# Patient Record
Sex: FEMALE | Race: WHITE | NOT HISPANIC OR LATINO | Employment: UNEMPLOYED | URBAN - METROPOLITAN AREA
[De-identification: names, ages, dates, MRNs, and addresses within clinical notes are randomized per-mention and may not be internally consistent; named-entity substitution may affect disease eponyms.]

---

## 2023-06-27 PROBLEM — N92.6 MENSTRUAL PERIODS IRREGULAR: Status: ACTIVE | Noted: 2023-06-27

## 2023-06-27 PROBLEM — N64.4 BREAST TENDERNESS IN FEMALE: Status: ACTIVE | Noted: 2023-06-27

## 2023-06-27 PROBLEM — Z00.00 ANNUAL PHYSICAL EXAM: Status: ACTIVE | Noted: 2023-06-27

## 2023-06-27 PROBLEM — R53.83 FATIGUE: Status: ACTIVE | Noted: 2023-06-27

## 2023-07-24 ENCOUNTER — HOSPITAL ENCOUNTER (OUTPATIENT)
Dept: ULTRASOUND IMAGING | Facility: HOSPITAL | Age: 38
Discharge: HOME | End: 2023-07-24
Payer: COMMERCIAL

## 2023-07-24 ENCOUNTER — HOSPITAL ENCOUNTER (OUTPATIENT)
Dept: MAMMOGRAPHY | Facility: HOSPITAL | Age: 38
Discharge: HOME | End: 2023-07-24
Payer: COMMERCIAL

## 2023-07-24 DIAGNOSIS — N64.4 BREAST TENDERNESS IN FEMALE: ICD-10-CM

## 2023-07-24 PROCEDURE — 77066 DX MAMMO INCL CAD BI: CPT | Performed by: RADIOLOGY

## 2023-07-24 PROCEDURE — G0279 TOMOSYNTHESIS, MAMMO: HCPCS

## 2023-07-24 PROCEDURE — 77062 BREAST TOMOSYNTHESIS BI: CPT | Performed by: RADIOLOGY

## 2023-07-24 PROCEDURE — 77066 DX MAMMO INCL CAD BI: CPT

## 2023-09-28 ENCOUNTER — E-VISIT (OUTPATIENT)
Dept: FAMILY MEDICINE CLINIC | Facility: TELEHEALTH | Age: 38
End: 2023-09-28
Payer: COMMERCIAL

## 2023-09-28 PROCEDURE — BRIGHTMDVISIT: Performed by: NURSE PRACTITIONER

## 2023-09-28 NOTE — E-VISIT TREATED
Chief Complaint: Cold, flu, COVID, sinus, hay fever, or seasonal allergies   Patient introduction   Patient is 37-year-old female with cough, congestion, voice hoarseness, and fatigue that started 3 to 5 days ago.   COVID-19 testing history, vaccination status, and exposure:    Has not been tested for COVID-19 since symptom onset.    Patient was prompted to take a self-test during the interview, but does not have a COVID-19 test kit.    Has not received a COVID-19 vaccination.    No known exposure to a person with a confirmed or suspected case of COVID-19.    No travel outside local community within the last 14 days.   Risk factors for severe disease from COVID-19 infection    BMI >= 25.   General presentation   Symptoms came on suddenly.   Fever:    No fever.   Sinus and nasal symptoms:    Yellow nasal drainage.    Nasal drainage is thick.    Postnasal drip.    Sinus pain or pressure on or around the forehead.    Patient first noticed sinus pain less than 5 days ago.    Sinus pain is not worse with Valsalva.    No nasal discharge.    No itchy nose or sneezing.    No history of unhealed nasal septal ulcer/nasal wound.    No history of antibiotic treatment for sinus infection in the last year.    No history of deviated septum or nasal polyps.   Throat symptoms:    Voice is moderately hoarse. Patient does not believe hoarseness is due to voice strain.    No sore throat.   Head and body aches:    Fatigue.    No headache.    No sweats.    No chills.    No myalgia.   Cough:    Cough is worse in the morning.    Cough is mildly productive of sputum.    Describes color of sputum as green.   Wheezing and shortness of breath:    No COPD diagnosis.    No asthma diagnosis.    No wheezing.    No shortness of breath.    No previous albuterol inhaler use for URIs, bronchitis, or pneumonia.    No previous steroid inhaler use for URIs, bronchitis, or pneumonia.   Chest pain:    No chest pain.   Ear symptoms:    Current symptoms  include fullness and crackling or popping in the ear(s).   Dizziness:    Mild dizziness that does not interfere with daily activities.   Allergies:    No history of allergies.   Flu exposure:    No recent known exposure to a person with a confirmed flu diagnosis.    Has not had a flu vaccine this season.   Not taking any over-the-counter medications for current symptoms.   Review of red flags/alarm symptoms:    No changes in alertness or awareness.    No nuchal rigidity.    No decreased urination.   Pregnancy/menstrual status/breastfeeding:    Not pregnant.    Not breastfeeding.    Regarding date of last menstrual period, patient writes: 9/22/2023.   Self-exam:    Height: 165 centimeters    Weight: 96.1 kilograms    No difficulty moving their chin toward their chest.    Neck lymph nodes feel normal.   Recent antibiotic use:    Has not taken antibiotics for similar symptoms within the past month.   Current medications   Currently taking vitamin C, vitamin B complex .   Medication allergies   None.   Medication contraindication review   No history of anaphylactic reaction to beta-lactam antibiotics; aspirin triad; blood dyscrasia; bone marrow depression; catecholamine-releasing paraganglioma; coronary artery disease; coagulation disorder; congenital long QT syndrome; depression; electrolyte abnormalities; fungal infection; GI bleeding; GI obstruction; G6PD deficiency; heart arrhythmia; hypertension; mononucleosis; myasthenia; recent myocardial infarction; NSAID-induced asthma/urticaria; Parkinson's disease; pheochromocytoma; porphyria; Reye syndrome; seizure disorder; ulcerative colitis; and urinary retention.   No history of metoclopramide-associated dystonic reaction and tardive dyskinesia.   No known history of amoxicillin-clavulanate-associated cholestatic jaundice or hepatic impairment.   No known history of azithromycin-associated cholestatic jaundice or hepatic impairment.   Past medical history   Immune  conditions: No immunocompromising conditions.   No history of cancer.   Social history   Never smoked tobacco.   High-risk household contacts:    Household contact under the age of 5.   Patient did not request an excuse note.   Assessment   Viral sinusitis.   This is the likely diagnosis based on patient's interview responses, including:    Symptom profile    Duration of symptoms is shorter than 10 days   Plan   Medications:    benzonatate 200 mg capsule RX 200mg 1 cap PO tid PRN 7d for cough. Do not chew or cut these capsules. Amount is 21 cap.    ibuprofen 200 mg tablet OTC 200mg 2 tabs PO q8h PRN 10d for any fever, pain, or discomfort associated with your condition. Do not exceed 3200mg in a 24-hour period. Amount is 60 tab.    Mucus Relief  mg tablet, extended release OTC 600mg 1 tab PO q12h PRN 7d for cough and congestion. Amount is 14 tab.    fluticasone 50 mcg/actuation nasal spray,suspension OTC 50mcg 2 sprays each nostril nasal qd 30d for nasal symptoms due to allergies or sinusitis. Fluticasone needs to be used every day to be effective. It may take up to a week for the full effects of the medication to be seen. Brands to look for include Flonase. Amount is 16 g.   The patient's prescription will be sent to:   [id number] [address] [address]   Phone: [phone]     Fax: [phone]   Patient informed to purchase OTC medications.   Education:    Condition and causes    Prevention    Treatment and self-care    When to call provider   ----------   Electronically signed by COLTON Sánchez on 2023-09-28 at 04:12AM   ----------   Patient Interview Transcript:   Which of these symptoms are bothering you? Select all that apply.    Cough    Stuffed-up nose or sinuses    Hoarse voice or loss of voice    Fatigue or tiredness   Not selected:    Shortness of breath    Runny nose    Itchy or watery eyes    Itchy nose or sneezing    Loss of smell or taste    Sore  "throat    Headache    Fever    Sweats    Chills    Muscle or body aches    Nausea or vomiting    Diarrhea    I don't have any of these symptoms   When did your current symptoms start? Select one.    3 to 5 days ago   Not selected:    Less than 48 hours ago    6 to 9 days ago    10 to 14 days ago    2 to 3 weeks ago    3 to 4 weeks ago    More than a month ago   Do you know the exact date your symptoms started? If so, enter the date as MM/DD/YY. Select one.    No   Not selected:    Yes (specify)   Did your symptoms come on suddenly or gradually? Select one.    Suddenly   Not selected:    Gradually    I'm not sure   Since your current symptoms started, have you had a viral test for COVID-19? - This includes home self-tests as well as nose swab or saliva tests done at a doctor's office, lab, or testing site. - It does NOT include antibody tests, which use a blood sample to test for past infection. Select one.    No   Not selected:    Yes   Taking a home COVID test can help your provider give you the best care. If you have a COVID test kit, take the test now before continuing with this interview. Do you have a COVID test kit? Select one.    No, I don't have a test kit   Not selected:    Yes, and I'll take a test now    Yes, but I prefer not to take a test now   Have you gotten the COVID-19 vaccine? Select one.    No   Not selected:    Yes   In the last 14 days, have you traveled outside of your local community? This includes travel by car, RV, bus, train, or plane. Travel increases your chances of getting and spreading COVID-19. Select one.    No   Not selected:    Yes   In the last 14 days, have you had close contact with someone who has coronavirus (COVID-19)? \"Close contact\" means any of these: - Living in the same household as someone with COVID-19. - Caring for someone with COVID-19. - Being within 6 feet of someone with COVID-19 for a total of at least 15 minutes over a 24-hour period. For example, three 5-minute " "exposures for a total of 15 minutes. - Being in direct contact with respiratory droplets from someone with COVID-19 (being coughed on, kissing, sharing utensils). Select one.    No, not that I know of   Not selected:    Yes, a confirmed case    Yes, a suspected case   Do you cough so hard that it's made you gag or vomit? By gag, we mean has your coughing made you choke or dry heave? Select all that apply.    No   Not selected:    Yes, my coughing has made me gag    Yes, my coughing has made me vomit   When is your cough the worst? Select all that apply.    In the morning, or when I wake up   Not selected:    During the day    At nighttime, or while I'm sleeping    I haven't noticed a difference depending on time of day   Are you coughing up mucus or phlegm? Select one.    Yes, a little   Not selected:    No, my cough is dry    Yes, a lot   What color is most of the mucus or phlegm that you're coughing up? Select one.    Green or greenish   Not selected:    Clear    White/frothy    Yellow or yellowish    Red or pink    I'm not sure   Do you feel sinus pain or pressure in any of these areas?    In my forehead   Not selected:    Around my eyes    Behind my nose    In my cheeks    In my upper teeth or jaw    No   When did you first notice your sinus pain or pressure? Select one.    Less than 5 days ago   Not selected:    5 to 9 days ago    10 to 14 days ago    2 to 4 weeks ago    1 month ago or longer   Does coughing, sneezing, or leaning forward make your sinuses feel worse? Select one.    No   Not selected:    Yes   What color is your nasal drainage? Select one.    Yellow or yellowish   Not selected:    Clear    White    Green or greenish    My nose is stuffed but not draining or running   Is your nasal drainage thick or thin? Select one.    Thick   Not selected:    Thin   Is there any drainage (mucus) going down the back of your throat? This kind of drainage is also called \"postnasal drip.\" Select one.    Yes   Not " selected:    No, not that I know of   Since your symptoms started, have you felt dizzy? Select one.    Yes, but I can continue with my regular daily activities   Not selected:    Yes, and it makes it hard to stand, walk, or do daily activities    No   Do you have chest pain? You might also feel it as discomfort, aching, tightness, or squeezing in the chest. Select one.    No   Not selected:    Yes   Have you urinated at least 3 times in the last 24 hours? Select one.    Yes   Not selected:    No   Changes in alertness or awareness may mean you need emergency care. Since your symptoms started, have you had any of these? Select all that apply.    None of the above   Not selected:    Confusion    Slurred speech    Not knowing where you are or what day it is    Difficulty staying conscious    Fainting or passing out   Do your symptoms include a whistling sound, or wheezing, when you breathe? Select one.    No   Not selected:    Yes    I'm not sure   Early in this interview, you told us you were hoarse or you'd lost your voice. How would you describe the changes to your voice? Select one.    It's harder than usual to talk   Not selected:    It just sounds a little raspy    I can barely talk at all   Is it possible that you strained your voice? Singing, yelling, or talking more or louder than usual can cause voice strain. Select one.    No, not that I know of   Not selected:    Yes   Do you have any of these symptoms in your ear(s)? Select all that apply.    Fullness    Crackling or popping   Not selected:    Pain    Pressure    Plugged or blocked sensation    None of the above   Can you move your chin toward your chest?    Yes   Not selected:    No, my neck is too stiff   Are your glands/lymph nodes swollen, or does it hurt when you touch them?    No, not that I can tell   Not selected:    Yes   In the past week, has anyone around you (such as at school, work, or home) had a confirmed diagnosis of the flu? A confirmed  diagnosis means that a nose swab was done to verify a flu infection. Select all that apply.    No, not that I know of   Not selected:    I live with someone who has the flu    I've been within touching distance of someone who has the flu    I've walked by, or sat about 3 feet away from, someone who has the flu    I've been in the same building as someone who has the flu   Have you ever been diagnosed with asthma? Select one.    No   Not selected:    Yes   Have you ever been prescribed albuterol to use for wheezing, cough, or shortness of breath caused by a cold, bronchitis, or pneumonia? Albuterol (ProAir, Proventil, Ventolin) is prescribed as an inhaler or a solution to be used with a nebulizer machine. Select one.    No, not that I know of   Not selected:    Yes   Have you ever been prescribed a steroid inhaler to use for wheezing, cough, or shortness of breath caused by a cold, bronchitis, or pneumonia? Some examples of steroid inhalers include Pulmicort, Flovent, Qvar, and Alvesco. Select one.    No, not that I know of   Not selected:    Yes   Have you ever been diagnosed with chronic obstructive pulmonary disease (COPD)? Select one.    No, not that I know of   Not selected:    Yes   In the past month, have you taken antibiotics for similar symptoms? Examples of antibiotics include amoxicillin, amoxicillin-clavulanate (Augmentin), penicillin, cefdinir (Omnicef), doxycycline, and clindamycin (Cleocin). Select one.    No   Not selected:    Yes (specify)   In the last year, how many times were you treated with antibiotics for a sinus infection? Select one.    None   Not selected:    1 to 3 times    4 or more times   Have you been diagnosed with a deviated septum or nasal polyps? The nose is divided into two nostrils by the septum. A crooked septum is called a deviated septum. Nasal polyps are growths inside the nose or sinuses. Select one.    No, not that I know of   Not selected:    Yes, but I had surgery to treat  them    Yes, I have a deviated septum    Yes, I have nasal polyps    Yes, I have a deviated septum and nasal polyps   Do you have a sore inside your nose that won't heal? Select one.    No, not that I know of   Not selected:    Yes   Do you have allergies (pollen, dust mites, mold, animal dander)? Select one.    No, not that I know of   Not selected:    Yes   Have you had a flu shot this season? Select one.    No   Not selected:    Yes, less than 2 weeks ago    Yes, 2 to 4 weeks ago    Yes, 1 to 3 months ago    Yes, 3 to 6 months ago    Yes, more than 6 months ago   Are you pregnant? Select one.    No   Not selected:    Yes   When was your last menstrual period? If you don't currently have periods or no longer have periods, please briefly explain.    2023   Within the last 2 weeks, have you: - Given birth - Had a miscarriage - Had a pregnancy loss - Had an  Being postpartum (live birth or loss) within the last 2 weeks increases your risk of flu complications. Select one.    No   Not selected:    Yes   Are you breastfeeding? Select one.    No   Not selected:    Yes   The flu and COVID-19 can be more serious for people in certain groups. The next few questions help us figure out if you or anyone you live with is at higher risk for complications from these infections. Do any of these statements apply to you? Select all that apply.    None of the above   Not selected:    I'm     I'm     I'm Black    I'm  or    Do you smoke tobacco? Select one.    No   Not selected:    Yes, every day    Yes, some days    No, I quit   Do you have any of these conditions? Select all that apply.    None of the above   Not selected:    Chronic lung disease, such as cystic fibrosis or interstitial fibrosis    Heart disease, such as congenital heart disease, congestive heart failure, or coronary artery disease    Disorder of the brain, spinal cord, or nerves and muscles, such as dementia,  cerebral palsy, epilepsy, muscular dystrophy, or developmental delay    Metabolic disorder or mitochondrial disease    Cerebrovascular disease, such as stroke or another condition affecting the blood vessels or blood supply to the brain    Down syndrome    Mood disorder, including depression or schizophrenia spectrum disorders    Substance use disorder, such as alcohol, opioid, or cocaine use disorder    Tuberculosis   Do you live in a group care setting? Examples include: - Nursing home - Residential care - Psychiatric treatment facility - Group home - [facility] - Board and care home - Homeless shelter - Foster care setting Select one.    No   Not selected:    Yes   Are you a healthcare worker? Select one.    No   Not selected:    Yes   People with a very high body mass index (BMI) are at higher risk for developing complications from the flu and severe illness from COVID-19. To determine your BMI, we need to know your weight and height. Please enter your weight (in pounds).    Weight   Please enter your height.    Height   Do you have any of these conditions that can affect the immune system? Scroll to see all options. Select all that apply.    None of these   Not selected:    History of bone marrow transplant    Chronic kidney disease    Chronic liver disease (including cirrhosis)    HIV/AIDS    Inflammatory bowel disease (Crohn's disease or ulcerative colitis)    Lupus    Moderate to severe plaque psoriasis    Multiple sclerosis    Rheumatoid arthritis    Sickle cell anemia    Alpha or beta thalassemia    History of solid organ transplant (kidney, liver, or heart)    History of spleen removal    An autoimmune disorder not listed here (specify)    A condition requiring treatment with long-term use of oral steroids (such as prednisone, prednisolone, or dexamethasone) (specify)   Have you ever been diagnosed with cancer? Select one.    No   Not selected:    Yes, I have cancer now    Yes, but I'm in remission   Do  any of these apply to you? Select all that apply.    None of the above   Not selected:    I've been hospitalized within the last 5 days    I have diabetes    I'm in close contact with a child in    The flu and COVID-19 can be more serious for people in certain groups. Do any of these apply to the people who live with you? Select all that apply.    Under age 5   Not selected:    Over age 65            Black     or     Pregnant    Has given birth, had a miscarriage, had a pregnancy loss, or had an  in the last 2 weeks    None of the above   Does any member of your household have any of these medical conditions? Select all that apply.    None of the above   Not selected:    Asthma    Disorders of the brain, spinal cord, or nerves and muscles, such as dementia, cerebral palsy, epilepsy, muscular dystrophy, or developmental delay    Chronic lung disease, such as COPD or cystic fibrosis    Heart disease, such as congenital heart disease, congestive heart failure, or coronary artery disease    Cerebrovascular disease, such as stroke or another condition affecting the blood vessels or blood supply to the brain    Blood disorders, such as sickle cell disease    Diabetes    Metabolic disorders such as inherited metabolic disorders or mitochondrial disease    Kidney disorders    Liver disorders    Weakened immune system due to illness or medications such as chemotherapy or steroids    Children under the age of 19 who are on long-term aspirin therapy    Extreme obesity (BMI > 40)   Do you have any of these conditions? Scroll to see all options. Select all that apply.    None of the above   Not selected:    Aspirin triad (also known as Samter's triad or ASA triad)    Asthma or hives from taking aspirin or other NSAIDs, such as ibuprofen or naproxen    Blockage or narrowing of the blood vessels of the heart    Blood clotting disorder    Blood dyscrasia, such anemia,  leukemia, lymphoma, or myeloma    Bone marrow depression    Catecholamine-releasing paraganglioma    Congenital long QT syndrome    Depression    Difficulty urinating or completely emptying your bladder    Uncorrected electrolyte abnormalities    Fungal infection    Gastrointestinal (GI) bleeding    Gastrointestinal (GI) obstruction    G6PD deficiency    Recent heart attack    High blood pressure    Irregular heartbeat or heart rhythm    Mononucleosis (mono)    Myasthenia gravis    Parkinson's disease    Pheochromocytoma    Reye syndrome    Seizure disorder    Thyroid disease    Ulcerative colitis   Have you ever had either of these conditions? Select all that apply.    No   Not selected:    Metoclopramide-associated dystonic reaction    Tardive dyskinesia   Just a few more questions about medications, and then you're finished. Have you used any non-prescription medications or nasal sprays for your current symptoms? Examples include saline sprays, decongestants, NyQuil, and Tylenol. Select one.    No   Not selected:    Yes   Have you taken any monoamine oxidase inhibitor (MAOI) medications in the last 14 days? Examples include rasagiline (Azilect), selegiline (Eldepryl, Zelapar), isocarboxazid (Marplan), phenelzine (Nardil), and tranylcypromine (Parnate). Select one.    No, not that I know of   Not selected:    Yes   Do you take Kynmobi or Apokyn (apomorphine)? Select one.    No   Not selected:    Yes   Are you taking any other medications, vitamins, or supplements? Select one.    Yes   Not selected:    No   Have you ever had an allergic or bad reaction to any medication? Select one.    No   Not selected:    Yes   Are you allergic to milk or to the proteins found in milk (for example, whey or casein)? A milk allergy is different from lactose intolerance. Select one.    No, not that I know of   Not selected:    Yes   Have you ever had jaundice or liver problems as a result of taking amoxicillin-clavulanate  (Augmentin)? Jaundice is a condition in which the skin and the whites of the eyes turn yellow. Select all that apply.    No, not that I know of   Not selected:    Yes, jaundice    Yes, liver problems   Have you ever had jaundice or liver problems as a result of taking azithromycin (Zithromax, Zmax)? Jaundice is a condition in which the skin and the whites of the eyes turn yellow. Select all that apply.    No, not that I know of   Not selected:    Yes, jaundice    Yes, liver problems   Do you need a doctor's note? A doctor's note confirms that you received care today and states when you can return to school or work. It does not contain information about your diagnosis or treatment plan. Your provider will make the final decision on whether to give you a doctor's note and for how long. Doctor's notes CANNOT be backdated. We can't provide medical leave paperwork through this type of visit. If more paperwork is needed to request time off, contact your primary care provider. Select one.    No   Not selected:    Today only (1 day)    Today and tomorrow (2 days)    3 days    5 days    7 days    10 days    14 days   Is there anything you'd like to add about your symptoms? Please limit your comments to the symptoms asked about in this interview. If you include comments about other concerns, your provider may recommend that you be seen in person.   The patient did not enter any additional information.   ----------   Medical history   Medical history data does not currently exist for this patient.

## 2023-09-28 NOTE — EXTERNAL PATIENT INSTRUCTIONS
Note   rest fluids PCP if symptoms continue ER for worsening symptoms such as high fever, chest pain or SOA   Diagnosis   Viral sinusitis   My name is COLTON Sánchez, and I'm a healthcare provider at [facility]. From your interview, I see that you have viral sinusitis (sinus infection).   To prevent the spread of illness to others, I recommend that you stay home and away from other people as much as possible while you're sick. When you need to be around other people, consider wearing a face mask.   Here are the main things to know about your current symptoms:    A viral sinusitis infection will get better on its own.    You can use the medications I recommend here to help ease your symptoms.   Based on what you told me in your interview, I haven't prescribed any antibiotics. Antibiotics fight bacteria, not viruses. The latest research shows that 90% of sinus infections are caused by viruses. Antibiotics won't help with your viral infection. They could even make you feel worse as they can cause or worsen nausea, diarrhea, and stomach pain. The following factors suggest that a virus is causing your symptoms:    You've had symptoms for less than 10 days. A bacterial infection is suspected when you've had symptoms longer than 10 days without improvement.    You haven't had a fever. Bacterial infections can cause a high fever.   Medications   Your pharmacy   [id number] [address] [id number] [phone]     Prescription   Benzonatate (200mg): Take 1 capsule by mouth 3 times a day as needed for cough. Do not chew or cut these capsules.   Non-prescription   Ibuprofen (200mg): Take 2 tablets by mouth every 8 hours as needed for up to 10 days for any fever, pain, or discomfort associated with your condition. Do not exceed 3200mg in a 24-hour period.   Mucus Relief ER oral tablet, extended release (600mg): Take 1 tablet by mouth every 12 hours as needed for cough and congestion.    Fluticasone (50mcg): Spray 2 times in each nostril daily for nasal symptoms due to allergies or sinusitis. Fluticasone needs to be used every day to be effective. It may take up to a week for the full effects of the medication to be seen. Brands to look for include Flonase.   About your diagnosis   Viral sinusitis is an infection of the sinuses, the hollow spaces connected to your nasal passages. These passages swell and block the drainage of fluid or mucus from the nose, causing pain, pressure, and congestion. These are the key things to know about a viral sinus infection:    It usually starts with cold symptoms.    Some medications can lessen your symptoms.    You can spread it to other people.    The 200 different viruses that cause the common cold can also cause a viral sinus infection.   Common symptoms include fever, sneezing, runny nose, congestion, sore throat, and cough. You may also notice fatigue, body aches, difficulty sleeping, or decreased appetite.   What to expect   You should feel better within 1 to 2 weeks.   When to seek care   Call us at [phone]   with any sudden or unexpected symptoms.    Fever that measures over 103F or continues for more than 3 days.    A worsening headache.    Swallowing becomes extremely difficult or impossible.    Your hoarse voice or loss of voice lasts longer than 2 weeks.    Your sinus pain continues for 10 days or more, without improvement.    More than 5 episodes of diarrhea in a day.    More than 5 episodes of vomiting in a day.    Coughing up red or bloody mucus.    Severe shortness of breath.    Severe stomach pain.    Symptoms that get better for a few days, and then suddenly get worse.    Severe chest pain   Other treatment    Rest! Your body needs rest to recover and fight infection.    Drink plenty of water to stay hydrated.    Use a clean humidifier or a cool-mist vaporizer in your room at night. Breathing humid air may help you feel better.    Place a  warm, moist washcloth over your nose and forehead to help with your sinus pain and pressure.    Try non-prescription saline nasal sprays to help your nasal symptoms.    Try using a Neti Pot to flush out your stuffy nose and sinuses. Neti Pots are available at any drugstore without a prescription.    Gargle with salt water several times a day to help your throat feel better. Cough drops and throat lozenges may provide extra relief. A teaspoon of honey stirred into warm water or weak tea can help soothe a sore throat and cough.    Avoid smoke and air pollution. Smoke can make infections worse.   Prevention    Avoid close contact with other people when you're sick.    Cover your mouth and nose when you cough or sneeze. Use a tissue or cough into your elbow. Make sure that used tissues go directly into the trash.    Avoid touching your eyes, nose, or mouth while you're sick.    Wash your hands often, especially after coughing, sneezing, or blowing your nose. If soap and water are not available, use an alcohol-based hand .    If you or someone in your home or workplace is sick, disinfect commonly used items. This includes door handles, tables, computers, remotes, and pens.    Coronavirus (COVID-19) information   Common symptoms of COVID-19 include fever, cough, shortness of breath, fatigue, muscle or body aches, headaches, new loss of sense of taste or smell, sore throat, stuffy or runny nose, nausea or vomiting, and diarrhea. Most people who get COVID-19 have mild symptoms and can rest at home until they get better. Elderly people and those with chronic medical problems may be at risk for more serious complications.   FAQs about the COVID-19 vaccine   There are four authorized COVID-19 vaccines: Jose & Jose's Carol Vaccine (J&J/Carol), Moderna, Novavax, and Pfizer-BioNTech (Pfizer). The J&J/Carol and Novavax vaccines are approved for use in people aged 18 and older. The Moderna and Pfizer vaccines  are approved for those aged 6 months and older. All four are available at no cost. Even if you don't have health insurance, you can still get the COVID-19 vaccine for free.   Which vaccine is the best? Which vaccine should I get?   All four vaccines are highly effective. Even if you get COVID-19 after being vaccinated, all of the vaccines help prevent severe disease, hospitalization, and complications.   Most people should get whichever vaccine is first available to them. However, women younger than 50 years old should consider the rare risk of blood clots with low platelets after vaccination with the J&J/AudioCompass vaccine. This risk hasn't been seen with the other three vaccines.   Are the vaccines safe?   Yes. Hundreds of millions of people in the [place] have already safely received COVID-19 vaccines under the most intense safety monitoring in the history of the [place].   Do I need the vaccine if I've already had COVID?   Yes. Vaccination helps protect you even if you've already had COVID.   If you had COVID-19 and had symptoms, wait to get vaccinated until you've recovered and completed your isolation period.   If you tested positive for COVID-19 but did not have symptoms, you can get vaccinated after 5 full days have passed since you had a positive test, as long as you don't develop symptoms.   How many doses of the vaccine do I need?   Visit www.cdc.gov/coronavirus/2019-ncov/vaccines/stay-up-to-date.html   to find out how to stay up to date with your COVID-19 vaccines.   I'm immunocompromised. How many doses of the vaccine do I need?   For information on how immunocompromised people can stay up to date with their COVID-19 vaccines, visit www.cdc.gov/coronavirus/2019-ncov/vaccines/recommendations/immuno.html  .   What are the common side effects of the vaccine?   A sore arm, tiredness, headache, and muscle pain may occur within two days of getting the vaccine and last a day or two. For the Moderna or Pfizer vaccines, side  effects are more common after the second dose. People over the age of 55 are less likely to have side effects than younger people.   After I'm up to date on vaccines, can I still get or spread COVID?   Yes, you can still get COVID, but your disease should be milder. And your risk of serious illness, hospitalization, and complications will be much lower, especially if you're up to date. Unfortunately, you can still spread COVID if you've been vaccinated. That's why it's important to follow isolation guidelines if you get sick or test positive.   After I'm up to date on vaccines, can I go back to normal?   You should still wear a mask indoors in public if:    It's required by laws, rules, regulations, or local guidance.    You have a weakened immune system.    Your age puts you at increased risk of severe disease.    You have a medical condition that puts you at increased risk of severe disease.    Someone in your household has a weakened immune system, is at increased risk for severe disease, or is unvaccinated.    You're in an area of high transmission.   Where can I get a COVID-19 vaccine?   Visit [facility]'s website for more information. To find a COVID-19 vaccination site near you, visit www.vaccines.gov/  , call [phone]  , or text your zip code to 298414 (iPAYst). Message and data rates may apply.   What about travel?   Travel increases your risk of exposure to COVID-19. For more information, see www.cdc.gov/coronavirus/2019-ncov/travelers/index.html  .   I've had close contact with someone who has COVID. Do I need to quarantine, and if so, for how long?   For the most current answer, including a calculator to determine whether you need to stay home and for how long, visit www.cdc.gov/coronavirus/2019-ncov/your-health/quarantine-isolation.html  .   I've tested positive for COVID. How long do I need to isolate?   For the latest recommendations, including a calculator to determine how long you need to  stay home, visit www.cdc.gov/coronavirus/2019-ncov/your-health/quarantine-isolation.html  .   What if I develop symptoms that might be from COVID?   For the latest recommendations on what to do if you're sick, including when to seek emergency care, visit www.cdc.gov/coronavirus/2019-ncov/if-you-are-sick/steps-when-sick.html  .    Flu vaccine information   Who should get a flu vaccine?   Everyone 6 months of age and older should get a yearly flu vaccine.   When should I get vaccinated?   You should get a flu vaccine by the end of October. Once you're vaccinated, it takes about two weeks for antibodies to develop and protect you against the flu. That's why it's important to get vaccinated as soon as possible.   After October, is it too late to get vaccinated?   No. You should still get vaccinated. As long as the flu viruses are still in your community, flu vaccines will remain available, even into January of next year or later.   Why do I need a flu vaccine EVERY year?   Flu viruses are constantly changing, so flu vaccines are usually updated from one season to the next. Your protection from the flu vaccine also lessens over time.   Is the flu vaccine safe?   Yes. Over the last 50 years, hundreds of millions of Americans have safely received the flu vaccines.   What are the side effects of flu vaccines?   You CANNOT get the flu from a flu vaccine. Common side effects of the flu shot include soreness, redness and/or swelling where the shot was given, low grade fever, and aches. Common side effects of the nasal spray flu vaccine for adults include runny nose, headaches, sore throat, and cough. For children, side effects include wheezing, vomiting, muscle aches, and fever.   Does the flu vaccine increase your risk of getting COVID-19?   No. There is no evidence that getting a flu vaccine increases your risk of getting COVID-19.   Is it safe to get the flu vaccine along with a COVID-19 vaccine?   Yes. It's safe to get the  flu vaccine with a COVID-19 vaccine or booster.   Contact your healthcare provider TODAY for details on when and where to get your flu vaccine.   Your provider   Your diagnosis was provided by COLTON Sánchez, a member of your trusted care team at [facility].   If you have any questions, call us at [phone]  .

## 2023-10-18 ENCOUNTER — OFFICE VISIT (OUTPATIENT)
Dept: FAMILY MEDICINE CLINIC | Facility: CLINIC | Age: 38
End: 2023-10-18
Payer: COMMERCIAL

## 2023-10-18 VITALS
WEIGHT: 210.6 LBS | OXYGEN SATURATION: 100 % | DIASTOLIC BLOOD PRESSURE: 78 MMHG | TEMPERATURE: 98.6 F | HEIGHT: 65 IN | SYSTOLIC BLOOD PRESSURE: 130 MMHG | HEART RATE: 80 BPM | BODY MASS INDEX: 35.09 KG/M2 | RESPIRATION RATE: 24 BRPM

## 2023-10-18 DIAGNOSIS — R05.9 COUGH, UNSPECIFIED TYPE: Primary | ICD-10-CM

## 2023-10-18 PROCEDURE — 99214 OFFICE O/P EST MOD 30 MIN: CPT | Performed by: STUDENT IN AN ORGANIZED HEALTH CARE EDUCATION/TRAINING PROGRAM

## 2023-10-18 RX ORDER — ALBUTEROL SULFATE 90 UG/1
2 AEROSOL, METERED RESPIRATORY (INHALATION) EVERY 4 HOURS PRN
Qty: 1 G | Refills: 0 | Status: SHIPPED | OUTPATIENT
Start: 2023-10-18

## 2023-10-18 RX ORDER — METHYLPREDNISOLONE 4 MG/1
TABLET ORAL
Qty: 21 TABLET | Refills: 0 | Status: SHIPPED | OUTPATIENT
Start: 2023-10-18

## 2023-10-18 RX ORDER — AZITHROMYCIN 250 MG/1
TABLET, FILM COATED ORAL
Qty: 6 TABLET | Refills: 0 | Status: SHIPPED | OUTPATIENT
Start: 2023-10-18

## 2023-10-18 NOTE — PROGRESS NOTES
"Chief Complaint  Cough (Pt has had cough for 1 month and is now having rib pain making it harder to breath and cough. )    History of Present Illness    Cough  Main symptom: cough without sputum  She had some sinus pressure that resolved  Began: several weeks  She has hoarse voice that is slowly improving.   She developed left rib pain from coughing Friday or Saturday  No injury that she is aware of recently but fell on ribs years ago. It hurts to press on the rib.  Sick contacts: her daughter has had a virus.   Fever: not present.   Sore throat not present   No other symptoms present.     She is not pregnant or breastfeeding    The following portions of the patient's history were reviewed and updated as appropriate: allergies, current medications, past family history, past medical history, past social history, past surgical history, and problem list.    OBJECTIVE:  /78   Pulse 80   Temp 98.6 °F (37 °C) (Temporal)   Resp 24   Ht 165.1 cm (65\")   Wt 95.5 kg (210 lb 9.6 oz)   SpO2 100%   BMI 35.05 kg/m²       Physical Exam  Constitutional:       General: She is not in acute distress.     Appearance: Normal appearance.   HENT:      Head: Normocephalic and atraumatic.      Right Ear: Tympanic membrane normal.      Left Ear: Tympanic membrane normal.      Mouth/Throat:      Pharynx: No oropharyngeal exudate or posterior oropharyngeal erythema.   Eyes:      Extraocular Movements: Extraocular movements intact.   Cardiovascular:      Rate and Rhythm: Normal rate and regular rhythm.      Heart sounds: No murmur heard.  Pulmonary:      Effort: Pulmonary effort is normal. No respiratory distress.      Breath sounds: Normal breath sounds. No stridor. No wheezing, rhonchi or rales.   Musculoskeletal:      Comments: Pain present on palpation rib 8 on left     Skin:     Findings: No rash.   Neurological:      General: No focal deficit present.      Mental Status: She is alert.   Psychiatric:         Mood and Affect: " Mood normal.                    Assessment and Plan   Diagnoses and all orders for this visit:    1. Cough, unspecified type (Primary)  -     POCT Flu A&B, Molecular  -     POCT SARS-CoV-2 Antigen  -     Cancel: POCT Strep A, molecular  -     XR Chest PA & Lateral; Future    Other orders  -     albuterol sulfate  (90 Base) MCG/ACT inhaler; Inhale 2 puffs Every 4 (Four) Hours As Needed (cough).  Dispense: 1 g; Refill: 0  -     methylPREDNISolone (MEDROL) 4 MG dose pack; Take as directed on package instructions.  Dispense: 21 tablet; Refill: 0  -     azithromycin (ZITHROMAX) 250 MG tablet; Take 2 tablets the first day, then 1 tablet daily for 4 days.  Dispense: 6 tablet; Refill: 0      Laryngitis  Will give medrol pack. Counseled on elevated glucose, bp, anxiety insomnia.     Bronchitis  Will give z sultana and albuterol.       Rib pain  Present on palpation. Will xray chest.       Advised her to return in one week for check up. She can follow up sooner if needed.     Return in about 1 week (around 10/25/2023) for with pcp.       Brit Rankin D.O.  [facility] [place]

## 2023-12-21 PROCEDURE — 87086 URINE CULTURE/COLONY COUNT: CPT | Performed by: PHYSICIAN ASSISTANT

## 2023-12-21 PROCEDURE — 87186 SC STD MICRODIL/AGAR DIL: CPT | Performed by: PHYSICIAN ASSISTANT

## 2023-12-21 PROCEDURE — 87088 URINE BACTERIA CULTURE: CPT | Performed by: PHYSICIAN ASSISTANT

## 2024-04-14 PROBLEM — R53.83 FATIGUE: Status: RESOLVED | Noted: 2023-06-27 | Resolved: 2024-04-14

## 2024-04-14 PROBLEM — N64.4 BREAST TENDERNESS IN FEMALE: Status: RESOLVED | Noted: 2023-06-27 | Resolved: 2024-04-14

## 2024-04-14 PROBLEM — O34.219 PREVIOUS CESAREAN DELIVERY AFFECTING PREGNANCY: Status: ACTIVE | Noted: 2024-04-14

## 2024-04-14 PROBLEM — N92.6 MENSTRUAL PERIODS IRREGULAR: Status: RESOLVED | Noted: 2023-06-27 | Resolved: 2024-04-14

## 2024-04-14 PROBLEM — Z01.419 WELL WOMAN EXAM: Status: ACTIVE | Noted: 2024-04-14

## 2024-04-14 PROBLEM — O09.529 AMA (ADVANCED MATERNAL AGE) MULTIGRAVIDA 35+: Status: ACTIVE | Noted: 2024-04-14

## 2024-04-18 ENCOUNTER — INITIAL PRENATAL (OUTPATIENT)
Dept: OBSTETRICS AND GYNECOLOGY | Facility: CLINIC | Age: 39
End: 2024-04-18

## 2024-04-18 VITALS — SYSTOLIC BLOOD PRESSURE: 118 MMHG | DIASTOLIC BLOOD PRESSURE: 74 MMHG | WEIGHT: 201 LBS | BODY MASS INDEX: 33.45 KG/M2

## 2024-04-18 DIAGNOSIS — O34.219 PREVIOUS CESAREAN DELIVERY AFFECTING PREGNANCY: ICD-10-CM

## 2024-04-18 DIAGNOSIS — O09.522 MULTIGRAVIDA OF ADVANCED MATERNAL AGE IN SECOND TRIMESTER: Primary | ICD-10-CM

## 2024-04-18 PROBLEM — O09.299 HX OF PREECLAMPSIA, PRIOR PREGNANCY, CURRENTLY PREGNANT: Status: ACTIVE | Noted: 2024-04-18

## 2024-04-18 LAB
2ND TRIMESTER 4 SCREEN SERPL-IMP: NORMAL
AFP INTERP SERPL-IMP: NORMAL
EXTERNAL NIPT: NORMAL

## 2024-04-18 RX ORDER — PRENATAL VIT/IRON FUM/FOLIC AC 27MG-0.8MG
TABLET ORAL DAILY
COMMUNITY

## 2024-04-18 RX ORDER — ASPIRIN 81 MG/1
81 TABLET, CHEWABLE ORAL DAILY
Start: 2024-04-18

## 2024-04-18 NOTE — PROGRESS NOTES
Subjective   Chief Complaint   Patient presents with    Initial Prenatal Visit       Tanja Calloway is a 38 y.o. year old .  Patient's last menstrual period was 2024.  She presents to be seen to initiate prenatal care.     Social History    Tobacco Use      Smoking status: Never      Smokeless tobacco: Never      Tobacco comments: None      The following portions of the patient's history were reviewed and updated as appropriate:vital signs, allergies, current medications, past medical history, past social history, past surgical history, and problem list.    Objective   Lab Review   No data reviewed    Imaging   Pelvic ultrasound report    Assessment & Plan   ASSESSMENT  38 y.o. year old  at 15w3d  Supervision of high risk pregnancy  Previous C/S with planned repeat C/S  AMA - declines testing  Prior postpartum PIH    PLAN  The problem list for pregnancy was initiated today  Tests ordered today:  Orders Placed This Encounter   Procedures    Urine Culture - Urine, Urine, Clean Catch     Standing Status:   Future     Order Specific Question:   Release to patient     Answer:   Routine Release [[id number]]    Chlamydia trachomatis, Neisseria gonorrhoeae, PCR w/ confirmation - Urine, Urine, Clean Catch     Standing Status:   Future     Order Specific Question:   Release to patient     Answer:   Routine Release [[id number]]    Obstetric Panel     Standing Status:   Future     Order Specific Question:   Release to patient     Answer:   Routine Release [[id number]]    HIV-1 / O / 2 Ag / Antibody     Standing Status:   Future     Order Specific Question:   Release to patient     Answer:   Routine Release [[id number]]    Urine Drug Screen - Urine, Clean Catch     Please confirm all positive UDS     Standing Status:   Future     Order Specific Question:   Release to patient     Answer:   Routine Release [[id number]]    TSH     Standing Status:   Future     Order Specific Question:   Release to patient      Answer:   Routine Release [[id number]]     Testing for GC / Chlamydia / trichomonas was done today  Genetic testing reviewed: NIPT ([place]) and they have considered the information and are not interested in having genetic testing performed.  Information reviewed: exercise in pregnancy, nutrition in pregnancy, weight gain in pregnancy, work and travel restrictions during pregnancy, list of OTC medications acceptable in pregnancy, and call coverage groups    Total time spent today with Tanja  was 45 minutes (level 4).  Off this time, 100% was spent face-to-face time coordinating care, answering her questions and counseling regarding exercise in pregnancy, nutrition in pregnancy, weight gain in pregnancy, work and travel restrictions during pregnancy, list of OTC medications acceptable in pregnancy and call coverage groups and pathophysiology of her presenting problem along with plans for any diagnositc work-up and treatment.  The time reported only includes face-to-face time and excludes any procedural based activities that occurred on the same date of service.    New Medications Ordered This Visit   Medications    aspirin 81 MG chewable tablet     Sig: Chew 1 tablet Daily.       Follow up: 5 week(s) with U/S for screening       This note was electronically signed.    Janes Jacob MD  April 18, 2024

## 2024-05-16 ENCOUNTER — LAB (OUTPATIENT)
Dept: LAB | Facility: HOSPITAL | Age: 39
End: 2024-05-16

## 2024-05-16 DIAGNOSIS — O09.522 MULTIGRAVIDA OF ADVANCED MATERNAL AGE IN SECOND TRIMESTER: ICD-10-CM

## 2024-05-16 LAB
ABO GROUP BLD: NORMAL
AMPHET+METHAMPHET UR QL: NEGATIVE
AMPHETAMINES UR QL: NEGATIVE
BARBITURATES UR QL SCN: NEGATIVE
BASOPHILS # BLD AUTO: 0.01 10*3/MM3 (ref 0–0.2)
BASOPHILS NFR BLD AUTO: 0.1 % (ref 0–1.5)
BENZODIAZ UR QL SCN: NEGATIVE
BLD GP AB SCN SERPL QL: NEGATIVE
BUPRENORPHINE SERPL-MCNC: NEGATIVE NG/ML
CANNABINOIDS SERPL QL: NEGATIVE
COCAINE UR QL: NEGATIVE
DEPRECATED RDW RBC AUTO: 44 FL (ref 37–54)
EOSINOPHIL # BLD AUTO: 0.08 10*3/MM3 (ref 0–0.4)
EOSINOPHIL NFR BLD AUTO: 0.9 % (ref 0.3–6.2)
ERYTHROCYTE [DISTWIDTH] IN BLOOD BY AUTOMATED COUNT: 13.1 % (ref 12.3–15.4)
FENTANYL UR-MCNC: NEGATIVE NG/ML
HBV SURFACE AG SERPL QL IA: NORMAL
HCT VFR BLD AUTO: 34.5 % (ref 34–46.6)
HGB BLD-MCNC: 12 G/DL (ref 12–15.9)
IMM GRANULOCYTES # BLD AUTO: 0.06 10*3/MM3 (ref 0–0.05)
IMM GRANULOCYTES NFR BLD AUTO: 0.6 % (ref 0–0.5)
LYMPHOCYTES # BLD AUTO: 2.17 10*3/MM3 (ref 0.7–3.1)
LYMPHOCYTES NFR BLD AUTO: 23.4 % (ref 19.6–45.3)
MCH RBC QN AUTO: 32.2 PG (ref 26.6–33)
MCHC RBC AUTO-ENTMCNC: 34.8 G/DL (ref 31.5–35.7)
MCV RBC AUTO: 92.5 FL (ref 79–97)
METHADONE UR QL SCN: NEGATIVE
MONOCYTES # BLD AUTO: 0.46 10*3/MM3 (ref 0.1–0.9)
MONOCYTES NFR BLD AUTO: 5 % (ref 5–12)
NEUTROPHILS NFR BLD AUTO: 6.51 10*3/MM3 (ref 1.7–7)
NEUTROPHILS NFR BLD AUTO: 70 % (ref 42.7–76)
NRBC BLD AUTO-RTO: 0 /100 WBC (ref 0–0.2)
OPIATES UR QL: POSITIVE
OXYCODONE UR QL SCN: NEGATIVE
PCP UR QL SCN: NEGATIVE
PLATELET # BLD AUTO: 239 10*3/MM3 (ref 140–450)
PMV BLD AUTO: 10.9 FL (ref 6–12)
RBC # BLD AUTO: 3.73 10*6/MM3 (ref 3.77–5.28)
RH BLD: POSITIVE
TRICYCLICS UR QL SCN: NEGATIVE
WBC NRBC COR # BLD AUTO: 9.29 10*3/MM3 (ref 3.4–10.8)

## 2024-05-16 PROCEDURE — 87086 URINE CULTURE/COLONY COUNT: CPT

## 2024-05-16 PROCEDURE — 36415 COLL VENOUS BLD VENIPUNCTURE: CPT

## 2024-05-16 PROCEDURE — 84443 ASSAY THYROID STIM HORMONE: CPT

## 2024-05-16 PROCEDURE — 80307 DRUG TEST PRSMV CHEM ANLYZR: CPT

## 2024-05-16 PROCEDURE — 80081 OBSTETRIC PANEL INC HIV TSTG: CPT

## 2024-05-16 PROCEDURE — 87591 N.GONORRHOEAE DNA AMP PROB: CPT

## 2024-05-16 PROCEDURE — 86803 HEPATITIS C AB TEST: CPT

## 2024-05-16 PROCEDURE — 87491 CHLMYD TRACH DNA AMP PROBE: CPT

## 2024-05-17 PROBLEM — R82.5 POSITIVE URINE DRUG SCREEN: Status: ACTIVE | Noted: 2024-05-17

## 2024-05-17 LAB
HCV AB SER QL: NORMAL
HIV 1+2 AB+HIV1 P24 AG SERPL QL IA: NORMAL
RPR SER QL: NORMAL
TSH SERPL DL<=0.05 MIU/L-ACNC: 1.51 UIU/ML (ref 0.27–4.2)

## 2024-05-18 LAB
BACTERIA SPEC AEROBE CULT: NORMAL
RUBV IGG SERPL IA-ACNC: 24.2 INDEX

## 2024-05-21 LAB
C TRACH RRNA SPEC QL NAA+PROBE: NEGATIVE
N GONORRHOEA RRNA SPEC QL NAA+PROBE: NEGATIVE

## 2024-05-22 LAB
LABORATORY COMMENT REPORT: NORMAL
OPIATES UR QL SCN: NEGATIVE NG/ML

## 2024-05-23 ENCOUNTER — ROUTINE PRENATAL (OUTPATIENT)
Dept: OBSTETRICS AND GYNECOLOGY | Facility: CLINIC | Age: 39
End: 2024-05-23

## 2024-05-23 VITALS — WEIGHT: 210.8 LBS | DIASTOLIC BLOOD PRESSURE: 70 MMHG | BODY MASS INDEX: 35.08 KG/M2 | SYSTOLIC BLOOD PRESSURE: 110 MMHG

## 2024-05-23 DIAGNOSIS — O34.219 PREVIOUS CESAREAN DELIVERY AFFECTING PREGNANCY: ICD-10-CM

## 2024-05-23 DIAGNOSIS — O09.522 MULTIGRAVIDA OF ADVANCED MATERNAL AGE IN SECOND TRIMESTER: Primary | ICD-10-CM

## 2024-05-23 NOTE — PROGRESS NOTES
Chief Complaint   Patient presents with    Routine Prenatal Visit     After US       HPI: Tanja is a  currently at 20w3d who today reports the following:  Contractions - No; Leaking - No; Vaginal bleeding - No; Heartburn - No.    ROS:  GI: Nausea - No ; Constipation - No; Diarrhea - No    Neuro: Headache - No ; Visual change - No    Respiratory: Cough - No; SOB - No; fever - No     EXAM:  Vitals: See prenatal flowsheet   Abdomen: See prenatal flowsheet   Urine glucose/protein: See prenatal flowsheet   Pelvic: See prenatal flowsheet     Lab Results   Component Value Date    ABO A 2024    RH Positive 2024    ABSCRN Negative 2024       MDM:  Impression: Supervision of high risk pregnancy  Advanced maternal age declining testing  False positive urine drug screen for opiates related to poppyseed ingestion  Prior  section x 3 with probable uterine window  Prior preeclampsia, declining baby aspirin prophylaxis   Tests ordered/done today: U/S for anatomic screening - U/S report is not available for review at this time   Counseling: Prenatal labs reviewed  Choose to stop the baby  Continue with Rx prenatal vitamins.  Explained recommendation will probably be elective repeat  section at 37 weeks but would like  consultation regarding timing of delivery   Tests ordered today for her next visit:   U/S to complete the anatomic screening

## 2024-06-24 ENCOUNTER — OFFICE VISIT (OUTPATIENT)
Dept: OBSTETRICS AND GYNECOLOGY | Facility: HOSPITAL | Age: 39
End: 2024-06-24
Payer: MEDICAID

## 2024-06-24 ENCOUNTER — HOSPITAL ENCOUNTER (OUTPATIENT)
Dept: WOMENS IMAGING | Facility: HOSPITAL | Age: 39
Discharge: HOME | End: 2024-06-24
Admitting: OBSTETRICS & GYNECOLOGY
Payer: MEDICAID

## 2024-06-24 ENCOUNTER — ROUTINE PRENATAL (OUTPATIENT)
Dept: OBSTETRICS AND GYNECOLOGY | Facility: CLINIC | Age: 39
End: 2024-06-24
Payer: MEDICAID

## 2024-06-24 VITALS — DIASTOLIC BLOOD PRESSURE: 73 MMHG | BODY MASS INDEX: 35.94 KG/M2 | SYSTOLIC BLOOD PRESSURE: 136 MMHG | WEIGHT: 216 LBS

## 2024-06-24 VITALS — WEIGHT: 216 LBS | BODY MASS INDEX: 35.94 KG/M2

## 2024-06-24 DIAGNOSIS — O34.219 PREVIOUS CESAREAN DELIVERY AFFECTING PREGNANCY: ICD-10-CM

## 2024-06-24 DIAGNOSIS — O34.219 PREVIOUS CESAREAN DELIVERY AFFECTING PREGNANCY: Primary | ICD-10-CM

## 2024-06-24 DIAGNOSIS — O09.522 MULTIGRAVIDA OF ADVANCED MATERNAL AGE IN SECOND TRIMESTER: ICD-10-CM

## 2024-06-24 DIAGNOSIS — O09.522 MULTIGRAVIDA OF ADVANCED MATERNAL AGE IN SECOND TRIMESTER: Primary | ICD-10-CM

## 2024-06-24 DIAGNOSIS — O09.299 HX OF PREECLAMPSIA, PRIOR PREGNANCY, CURRENTLY PREGNANT: ICD-10-CM

## 2024-06-24 PROCEDURE — 76811 OB US DETAILED SNGL FETUS: CPT

## 2024-06-24 PROCEDURE — 99214 OFFICE O/P EST MOD 30 MIN: CPT | Performed by: OBSTETRICS & GYNECOLOGY

## 2024-06-24 PROCEDURE — 76811 OB US DETAILED SNGL FETUS: CPT | Performed by: OBSTETRICS & GYNECOLOGY

## 2024-06-24 PROCEDURE — 99213 OFFICE O/P EST LOW 20 MIN: CPT | Performed by: OBSTETRICS & GYNECOLOGY

## 2024-06-24 NOTE — PROGRESS NOTES
"    Maternal/Fetal Medicine Consult Note   Date: 2024  Name: Tanja Calloway    : 1985     MRN: [id number]     Referring Provider: Janes Jacob MD    Chief Complaint  concern for uterine window, Previous C/S x 3    Subjective     History of Present Illness:  Tanja Calloway is a 38 y.o.  25w0d who presents today for uterine window    CEE: Estimated Date of Delivery: 10/7/24     ROS:   Otherwise Noted in HPI    Past Medical History:   Diagnosis Date    Hypothyroidism 2007    Been in the normal range for long time now    Preeclampsia in postpartum period     Preeclampsia in postpartum period       Past Surgical History:   Procedure Laterality Date     SECTION  10/07/2020     SECTION  2019     SECTION PRIMARY  2017    DILATATION AND CURETTAGE  10/02/2019      OB History          7    Para   3    Term   3       0    AB   3    Living   3         SAB   3    IAB   0    Ectopic   0    Molar   0    Multiple   0    Live Births   3          Obstetric Comments   2017 - Chao (FOB #1)   - Holley (FOB #1)   - Tu (FOB #1)  Current (FOB #1)               Current Outpatient Medications:     Prenatal Vit-Fe Fumarate-FA (prenatal vitamin 27-0.8) 27-0.8 MG tablet tablet, Take  by mouth Daily., Disp: , Rfl:     Objective     Vital Signs  /73   Wt 98 kg (216 lb)   LMP 2024   Estimated body mass index is 35.94 kg/m² as calculated from the following:    Height as of 23: 165.1 cm (65\").    Weight as of this encounter: 98 kg (216 lb).    Ultrasound Impression:   See Viewpoint     Assessment and Plan     Tanja Calloway is a 38 y.o.  25w0d who presents today for uterine window    Diagnoses and all orders for this visit:    1. Previous C/S x 3 - uterine window @ 20 week scan (Primary)  Assessment & Plan:  Patient with a history of 3 prior C-sections and concern for uterine window at the time of anatomy " ultrasound.  Today's ultrasound is concerning for thin lower uterine segment/uterine window.  We discussed careful return precautions regarding cramping or contractions.  We discussed that given this finding and 3 prior  sections would recommend delivery during the 36 or 37th week of pregnancy.  We discussed repeat evaluation at 32 weeks.  At the time of this ultrasound we discussed potential for inpatient observation versus outpatient with careful monitoring.  We discussed that there would be a low threshold for delivery after 34 weeks of patient came in with concerns for  labor.           Follow Up  Follow-up at 32 weeks    I spent 15 minutes caring for the patient on the day of service. This included: obtaining or reviewing a separately obtained medical history, reviewing patient records, performing a medically appropriate exam and/or evaluation, counseling or educating the patient/family/caregiver, ordering medications, labs, and/or procedures and documenting such in the medical record. This does not include time spent on review and interpretation of other tests such as fetal ultrasound or the performance of other procedures such as amniocentesis or CVS.      Dominic Thomas MD, FACOG  Maternal Fetal Medicine, [facility] [place]

## 2024-06-24 NOTE — LETTER
2024       No Recipients    Patient: Tanja Calloway   YOB: 1985   Date of Visit: 2024       Dear Janes Jacob MD,    Thank you for referring Tanja Calloway to me for evaluation. Below is a copy of my consult note.    If you have questions, please do not hesitate to call me. I look forward to following Tanja along with you.         Sincerely,        Dominic Thomas MD        CC:   No Recipients    Patient denies bleeding, leaking fluid or contractions  NIPT declined  Patients next follow up with Dr. Jacob is today        Maternal/Fetal Medicine Consult Note   Date: 2024  Name: Tanja Calloway    : 1985     MRN: [id number]     Referring Provider: Janes Jacob MD    Chief Complaint  concern for uterine window, Previous C/S x 3    Subjective     History of Present Illness:  Tanja Calloway is a 38 y.o.  25w0d who presents today for uterine window    CEE: Estimated Date of Delivery: 10/7/24     ROS:   Otherwise Noted in HPI    Past Medical History:   Diagnosis Date   • Hypothyroidism 2007    Been in the normal range for long time now   • Preeclampsia in postpartum period    • Preeclampsia in postpartum period       Past Surgical History:   Procedure Laterality Date   •  SECTION  10/07/2020   •  SECTION  2019   •  SECTION PRIMARY  2017   • DILATATION AND CURETTAGE  10/02/2019      OB History          7    Para   3    Term   3       0    AB   3    Living   3         SAB   3    IAB   0    Ectopic   0    Molar   0    Multiple   0    Live Births   3          Obstetric Comments   2017 - Chao (FOB #1)  2019 - Holley (FOB #1)   - Tu (FOB #1)  Current (FOB #1)               Current Outpatient Medications:   •  Prenatal Vit-Fe Fumarate-FA (prenatal vitamin 27-0.8) 27-0.8 MG tablet tablet, Take  by mouth Daily., Disp: , Rfl:     Objective     Vital Signs  /73   Wt 98 kg (216 lb)  "  LMP 2024   Estimated body mass index is 35.94 kg/m² as calculated from the following:    Height as of 23: 165.1 cm (65\").    Weight as of this encounter: 98 kg (216 lb).    Ultrasound Impression:   See Viewpoint     Assessment and Plan     Tanja Calloway is a 38 y.o.  25w0d who presents today for uterine window    Diagnoses and all orders for this visit:    1. Previous C/S x 3 - uterine window @ 20 week scan (Primary)  Assessment & Plan:  Patient with a history of 3 prior C-sections and concern for uterine window at the time of anatomy ultrasound.  Today's ultrasound is concerning for thin lower uterine segment/uterine window.  We discussed careful return precautions regarding cramping or contractions.  We discussed that given this finding and 3 prior  sections would recommend delivery during the 36 or 37th week of pregnancy.  We discussed repeat evaluation at 32 weeks.  At the time of this ultrasound we discussed potential for inpatient observation versus outpatient with careful monitoring.  We discussed that there would be a low threshold for delivery after 34 weeks of patient came in with concerns for  labor.           Follow Up  Follow-up at 32 weeks    I spent 15 minutes caring for the patient on the day of service. This included: obtaining or reviewing a separately obtained medical history, reviewing patient records, performing a medically appropriate exam and/or evaluation, counseling or educating the patient/family/caregiver, ordering medications, labs, and/or procedures and documenting such in the medical record. This does not include time spent on review and interpretation of other tests such as fetal ultrasound or the performance of other procedures such as amniocentesis or CVS.      Dominic Thomas MD, FACOG  Maternal Fetal Medicine, [facility]   "

## 2024-06-24 NOTE — PROGRESS NOTES
Patient denies bleeding, leaking fluid or contractions  NIPT declined  Patients next follow up with Dr. Jacob is today

## 2024-06-24 NOTE — PROGRESS NOTES
Chief Complaint   Patient presents with    Routine Prenatal Visit       HPI: Tanja is a  currently at 25w0d who today reports the following:  Contractions - No; Leaking - No; Vaginal bleeding - No; Heartburn - No.    ROS:  GI: Nausea - No ; Constipation - No; Diarrhea - No    Neuro: Headache - No ; Visual change - No    Respiratory: Cough - No; SOB - No; fever - No     EXAM:  Vitals: See prenatal flowsheet   Abdomen: See prenatal flowsheet   Urine glucose/protein: See prenatal flowsheet   Pelvic: See prenatal flowsheet     Lab Results   Component Value Date    ABO A 2024    RH Positive 2024    ABSCRN Negative 2024       MDM:  Impression: Supervision of high risk pregnancy  Previous C/S with planned repeat C/S  AMA - declines testing   Tests ordered/done today: none  U/S done at [facility] for follow-up with markedly thin lower uterine segment ostensibly no myometrium seen   Counseling: Prenatal labs reviewed  Continue with Rx prenatal vitamins.  I reviewed increased risks theoretically of uterine rupture should she go into labor and anticipate plan will be  at 36 to 37 weeks gestation  Strongly encouraged her to consider permanent sterilization at the time and explained increased risk of morbidly adherent placenta should she conceive subsequently   Tests ordered today for her next visit:   GCT  HgB  RPR

## 2024-06-24 NOTE — ASSESSMENT & PLAN NOTE
Patient with a history of 3 prior C-sections and concern for uterine window at the time of anatomy ultrasound.  Today's ultrasound is concerning for thin lower uterine segment/uterine window.  We discussed careful return precautions regarding cramping or contractions.  We discussed that given this finding and 3 prior  sections would recommend delivery during the 36 or 37th week of pregnancy.  We discussed repeat evaluation at 32 weeks.  At the time of this ultrasound we discussed potential for inpatient observation versus outpatient with careful monitoring.  We discussed that there would be a low threshold for delivery after 34 weeks of patient came in with concerns for  labor.

## 2024-07-15 ENCOUNTER — ROUTINE PRENATAL (OUTPATIENT)
Dept: OBSTETRICS AND GYNECOLOGY | Facility: CLINIC | Age: 39
End: 2024-07-15
Payer: MEDICAID

## 2024-07-15 ENCOUNTER — PREP FOR SURGERY (OUTPATIENT)
Dept: OTHER | Facility: HOSPITAL | Age: 39
End: 2024-07-15
Payer: MEDICAID

## 2024-07-15 VITALS — DIASTOLIC BLOOD PRESSURE: 74 MMHG | BODY MASS INDEX: 35.61 KG/M2 | WEIGHT: 214 LBS | SYSTOLIC BLOOD PRESSURE: 128 MMHG

## 2024-07-15 DIAGNOSIS — O34.219 PREVIOUS CESAREAN DELIVERY AFFECTING PREGNANCY: Primary | ICD-10-CM

## 2024-07-15 DIAGNOSIS — O34.219 PREVIOUS CESAREAN DELIVERY AFFECTING PREGNANCY: ICD-10-CM

## 2024-07-15 DIAGNOSIS — O09.522 MULTIGRAVIDA OF ADVANCED MATERNAL AGE IN SECOND TRIMESTER: Primary | ICD-10-CM

## 2024-07-15 DIAGNOSIS — Z30.2 REQUEST FOR STERILIZATION: ICD-10-CM

## 2024-07-15 DIAGNOSIS — O09.299 HX OF PREECLAMPSIA, PRIOR PREGNANCY, CURRENTLY PREGNANT: ICD-10-CM

## 2024-07-15 PROCEDURE — 99214 OFFICE O/P EST MOD 30 MIN: CPT | Performed by: OBSTETRICS & GYNECOLOGY

## 2024-07-15 RX ORDER — SODIUM CHLORIDE 0.9 % (FLUSH) 0.9 %
10 SYRINGE (ML) INJECTION AS NEEDED
OUTPATIENT
Start: 2024-07-15

## 2024-07-15 RX ORDER — ACETAMINOPHEN 500 MG
1000 TABLET ORAL ONCE
OUTPATIENT
Start: 2024-07-15 | End: 2024-07-15

## 2024-07-15 RX ORDER — CARBOPROST TROMETHAMINE 250 UG/ML
250 INJECTION, SOLUTION INTRAMUSCULAR AS NEEDED
OUTPATIENT
Start: 2024-07-15

## 2024-07-15 RX ORDER — SODIUM CHLORIDE, SODIUM LACTATE, POTASSIUM CHLORIDE, CALCIUM CHLORIDE 600; 310; 30; 20 MG/100ML; MG/100ML; MG/100ML; MG/100ML
125 INJECTION, SOLUTION INTRAVENOUS CONTINUOUS
OUTPATIENT
Start: 2024-07-15

## 2024-07-15 RX ORDER — CITRIC ACID/SODIUM CITRATE 334-500MG
30 SOLUTION, ORAL ORAL ONCE
OUTPATIENT
Start: 2024-07-15 | End: 2024-07-15

## 2024-07-15 RX ORDER — SODIUM CHLORIDE 9 MG/ML
40 INJECTION, SOLUTION INTRAVENOUS AS NEEDED
OUTPATIENT
Start: 2024-07-15

## 2024-07-15 RX ORDER — PROMETHAZINE HYDROCHLORIDE 12.5 MG/1
12.5 TABLET ORAL EVERY 6 HOURS PRN
OUTPATIENT
Start: 2024-07-15

## 2024-07-15 RX ORDER — MISOPROSTOL 200 UG/1
800 TABLET ORAL AS NEEDED
OUTPATIENT
Start: 2024-07-15

## 2024-07-15 RX ORDER — PROMETHAZINE HYDROCHLORIDE 12.5 MG/1
12.5 SUPPOSITORY RECTAL EVERY 6 HOURS PRN
OUTPATIENT
Start: 2024-07-15

## 2024-07-15 RX ORDER — LIDOCAINE HYDROCHLORIDE 10 MG/ML
0.5 INJECTION, SOLUTION EPIDURAL; INFILTRATION; INTRACAUDAL; PERINEURAL ONCE AS NEEDED
OUTPATIENT
Start: 2024-07-15

## 2024-07-15 RX ORDER — BUPIVACAINE HCL/0.9 % NACL/PF 0.1 %
2 PLASTIC BAG, INJECTION (ML) EPIDURAL ONCE
OUTPATIENT
Start: 2024-07-15 | End: 2024-07-15

## 2024-07-15 RX ORDER — KETOROLAC TROMETHAMINE 30 MG/ML
30 INJECTION, SOLUTION INTRAMUSCULAR; INTRAVENOUS ONCE
OUTPATIENT
Start: 2024-07-15 | End: 2024-07-15

## 2024-07-15 RX ORDER — OXYTOCIN/0.9 % SODIUM CHLORIDE 30/500 ML
650 PLASTIC BAG, INJECTION (ML) INTRAVENOUS ONCE
OUTPATIENT
Start: 2024-07-15 | End: 2024-07-15

## 2024-07-15 RX ORDER — METHYLERGONOVINE MALEATE 0.2 MG/ML
200 INJECTION INTRAVENOUS ONCE AS NEEDED
OUTPATIENT
Start: 2024-07-15 | End: 2024-07-16

## 2024-07-15 RX ORDER — SODIUM CHLORIDE 0.9 % (FLUSH) 0.9 %
10 SYRINGE (ML) INJECTION EVERY 12 HOURS SCHEDULED
OUTPATIENT
Start: 2024-07-15

## 2024-07-15 RX ORDER — OXYTOCIN/0.9 % SODIUM CHLORIDE 30/500 ML
85 PLASTIC BAG, INJECTION (ML) INTRAVENOUS ONCE
OUTPATIENT
Start: 2024-07-15 | End: 2024-07-15

## 2024-07-15 NOTE — PROGRESS NOTES
Chief Complaint   Patient presents with    Routine Prenatal Visit       HPI: Tanja is a  currently at 28w0d who today reports the following:  Contractions - No; Leaking - No; Vaginal bleeding - No; Heartburn - No.  She and her  have discussed permanent sterilization and are agreeable to proceed with tubal ligation at the time of planned     ROS:  GI: Nausea - No ; Constipation - No; Diarrhea - No    Neuro: Headache - No ; Visual change - No    Respiratory: Cough - No; SOB - No; fever - No     EXAM:  Vitals: See prenatal flowsheet   Abdomen: See prenatal flowsheet   Urine glucose/protein: See prenatal flowsheet   Pelvic: See prenatal flowsheet     Lab Results   Component Value Date    ABO A 2024    RH Positive 2024    ABSCRN Negative 2024       MDM:  Impression: Supervision of high risk pregnancy  LGA  Prior  section x 3 with suspected uterine window   Tests ordered/done today: GCT  HgB  RPR   Counseling: Prenatal labs reviewed  Continue with Rx prenatal vitamins.  Plan  section at 36 to 37 weeks as per advice from perinatology  Ultrasound already scheduled in 2 to 3 weeks time with perinatology and follow-up.  This should suffice as a relates to large for gestational age   Tests ordered today for her next visit:   none

## 2024-07-17 ENCOUNTER — APPOINTMENT (OUTPATIENT)
Dept: LAB | Facility: HOSPITAL | Age: 39
End: 2024-07-17
Payer: MEDICAID

## 2024-07-17 ENCOUNTER — TRANSCRIBE ORDERS (OUTPATIENT)
Dept: LAB | Facility: HOSPITAL | Age: 39
End: 2024-07-17
Payer: MEDICAID

## 2024-07-17 ENCOUNTER — LAB (OUTPATIENT)
Dept: LAB | Facility: HOSPITAL | Age: 39
End: 2024-07-17
Payer: MEDICAID

## 2024-07-17 DIAGNOSIS — O09.522 MULTIGRAVIDA OF ADVANCED MATERNAL AGE IN SECOND TRIMESTER: ICD-10-CM

## 2024-07-17 DIAGNOSIS — O09.299 HX OF PREECLAMPSIA, PRIOR PREGNANCY, CURRENTLY PREGNANT: ICD-10-CM

## 2024-07-17 DIAGNOSIS — O34.219 PREVIOUS CESAREAN DELIVERY, ANTEPARTUM CONDITION OR COMPLICATION: Primary | ICD-10-CM

## 2024-07-17 DIAGNOSIS — O34.219 PREVIOUS CESAREAN DELIVERY AFFECTING PREGNANCY: ICD-10-CM

## 2024-07-17 DIAGNOSIS — Z30.2 REQUEST FOR STERILIZATION: ICD-10-CM

## 2024-07-17 LAB — GLUCOSE 1H P 100 G GLC PO SERPL-MCNC: 115 MG/DL (ref 65–139)

## 2024-07-17 PROCEDURE — 82950 GLUCOSE TEST: CPT

## 2024-07-17 PROCEDURE — 86592 SYPHILIS TEST NON-TREP QUAL: CPT

## 2024-07-17 PROCEDURE — 82948 REAGENT STRIP/BLOOD GLUCOSE: CPT

## 2024-07-17 PROCEDURE — 36415 COLL VENOUS BLD VENIPUNCTURE: CPT

## 2024-07-18 ENCOUNTER — TELEPHONE (OUTPATIENT)
Dept: OBSTETRICS AND GYNECOLOGY | Facility: CLINIC | Age: 39
End: 2024-07-18
Payer: MEDICAID

## 2024-07-18 LAB — RPR SER QL: NORMAL

## 2024-07-18 NOTE — TELEPHONE ENCOUNTER
Patient saw on her Mychart that she is to get some blood work done.  She is wondering if she is supposed to do this now. Please Advise

## 2024-07-18 NOTE — TELEPHONE ENCOUNTER
Looks like she got some blood work done yesterday.  Once those blood tests are done that is all she needs done for the current time.

## 2024-07-18 NOTE — TELEPHONE ENCOUNTER
Called patient and informed her of Dr. Jacob' response - bloodwork is all caught up as it should be at this time, pending labs are not to be done for quite some time ( date).

## 2024-07-30 ENCOUNTER — ROUTINE PRENATAL (OUTPATIENT)
Dept: OBSTETRICS AND GYNECOLOGY | Facility: CLINIC | Age: 39
End: 2024-07-30
Payer: MEDICAID

## 2024-07-30 VITALS — WEIGHT: 217 LBS | SYSTOLIC BLOOD PRESSURE: 122 MMHG | DIASTOLIC BLOOD PRESSURE: 78 MMHG | BODY MASS INDEX: 36.11 KG/M2

## 2024-07-30 DIAGNOSIS — O34.219 PREVIOUS CESAREAN DELIVERY AFFECTING PREGNANCY: Primary | ICD-10-CM

## 2024-07-30 DIAGNOSIS — O09.523 MULTIGRAVIDA OF ADVANCED MATERNAL AGE IN THIRD TRIMESTER: ICD-10-CM

## 2024-07-30 PROCEDURE — 99213 OFFICE O/P EST LOW 20 MIN: CPT | Performed by: OBSTETRICS & GYNECOLOGY

## 2024-07-30 NOTE — PROGRESS NOTES
Chief Complaint   Patient presents with    Routine Prenatal Visit       HPI: Tanja is a  currently at 30w1d who today reports the following:  Contractions - No; Leaking - No; Vaginal bleeding -  No; Swelling of extremities - No.    ROS:  GI: Nausea - No; Constipation - No; Diarrhea - No    Neuro: Headache - No; Visual change - No    Respiratory: Cough - No; SOB - No; fever - No     EXAM:  Vitals: See prenatal flowsheet   Abdomen: See prenatal flowsheet   Urine glucose/protein: See prenatal flowsheet   Pelvic: See prenatal flowsheet   MDM:   Impression: Supervision of high risk pregnancy  Previous C/S with planned repeat C/S  AMA - declines testing   Tests done today: none   Topics discussed: Prenatal labs reviewed  Continue with Rx prenatal vitamins.  No additional counseling given - she has no specific complaints or concerns   Tests scheduled today for her next visit:   none

## 2024-07-30 NOTE — Clinical Note
Looks like she is scheduled to have a  on .  I am set to get on a plane for [place] that day.  Can we try to get it scooted back 1 day possible?  Thank you

## 2024-08-12 ENCOUNTER — OFFICE VISIT (OUTPATIENT)
Dept: OBSTETRICS AND GYNECOLOGY | Facility: HOSPITAL | Age: 39
End: 2024-08-12
Payer: MEDICAID

## 2024-08-12 ENCOUNTER — ROUTINE PRENATAL (OUTPATIENT)
Dept: OBSTETRICS AND GYNECOLOGY | Facility: CLINIC | Age: 39
End: 2024-08-12
Payer: MEDICAID

## 2024-08-12 ENCOUNTER — HOSPITAL ENCOUNTER (OUTPATIENT)
Dept: WOMENS IMAGING | Facility: HOSPITAL | Age: 39
Discharge: HOME | End: 2024-08-12
Admitting: OBSTETRICS & GYNECOLOGY
Payer: MEDICAID

## 2024-08-12 VITALS — WEIGHT: 223.6 LBS | SYSTOLIC BLOOD PRESSURE: 124 MMHG | DIASTOLIC BLOOD PRESSURE: 72 MMHG | BODY MASS INDEX: 37.21 KG/M2

## 2024-08-12 VITALS — BODY MASS INDEX: 37.44 KG/M2 | WEIGHT: 225 LBS | SYSTOLIC BLOOD PRESSURE: 125 MMHG | DIASTOLIC BLOOD PRESSURE: 70 MMHG

## 2024-08-12 DIAGNOSIS — O34.219 PREVIOUS CESAREAN DELIVERY AFFECTING PREGNANCY: Primary | ICD-10-CM

## 2024-08-12 DIAGNOSIS — O09.523 MULTIGRAVIDA OF ADVANCED MATERNAL AGE IN THIRD TRIMESTER: Primary | ICD-10-CM

## 2024-08-12 DIAGNOSIS — O34.219 PREVIOUS CESAREAN DELIVERY AFFECTING PREGNANCY: ICD-10-CM

## 2024-08-12 LAB
GLUCOSE UR STRIP-MCNC: NEGATIVE MG/DL
PROT UR STRIP-MCNC: NEGATIVE MG/DL

## 2024-08-12 PROCEDURE — 99213 OFFICE O/P EST LOW 20 MIN: CPT | Performed by: OBSTETRICS & GYNECOLOGY

## 2024-08-12 PROCEDURE — 99213 OFFICE O/P EST LOW 20 MIN: CPT

## 2024-08-12 PROCEDURE — 76819 FETAL BIOPHYS PROFIL W/O NST: CPT | Performed by: OBSTETRICS & GYNECOLOGY

## 2024-08-12 PROCEDURE — 76816 OB US FOLLOW-UP PER FETUS: CPT

## 2024-08-12 PROCEDURE — 76819 FETAL BIOPHYS PROFIL W/O NST: CPT

## 2024-08-12 PROCEDURE — 76816 OB US FOLLOW-UP PER FETUS: CPT | Performed by: OBSTETRICS & GYNECOLOGY

## 2024-08-12 NOTE — PROGRESS NOTES
"    Maternal/Fetal Medicine Consult Note   Date: 2024  Name: Tanja Calloway    : 1985     MRN: [id number]     Referring Provider: Janes Jacob MD    Chief Complaint  thin lower uterine section, prev C/S x 3, AMA    Subjective     History of Present Illness:  Tanja Calloway is a 38 y.o.  32w0d who presents today for thin NAVNEET    CEE: Estimated Date of Delivery: 10/7/24     ROS:   Otherwise Noted in HPI      Current Outpatient Medications:     Prenatal Vit-Fe Fumarate-FA (prenatal vitamin 27-0.8) 27-0.8 MG tablet tablet, Take  by mouth Daily., Disp: , Rfl:     Objective     Vital Signs  /70   Wt 102 kg (225 lb)   LMP 2024   Estimated body mass index is 37.44 kg/m² as calculated from the following:    Height as of 23: 165.1 cm (65\").    Weight as of this encounter: 102 kg (225 lb).    Ultrasound Impression:   See Viewpoint     Assessment and Plan     Tanja Calloway is a 38 y.o.  32w0d who presents today for thin NAVNEET    Diagnoses and all orders for this visit:    1. Previous C/S x 3 - uterine window @ 20 week scan (Primary)  Assessment & Plan:  Patient with a history of 3 prior C-sections and concern for uterine window at the time of anatomy ultrasound.  Today's ultrasound is concerning for thin lower uterine segment/uterine window though no obvious outpouching of the lower uterine segment..  We discussed careful return precautions regarding cramping or contractions.  We discussed that given this finding and 3 prior  sections would recommend delivery during the 36 or 37th week of pregnancy.  We discussed weekly NSTs as a method of contraction monitoring. We discussed that there would be a low threshold for delivery after 34 weeks of patient came in with concerns for  labor.            Follow Up  No follow-ups on file.    I spent 10 minutes caring for the patient on the day of service. This included: obtaining or reviewing a separately " obtained medical history, reviewing patient records, performing a medically appropriate exam and/or evaluation, counseling or educating the patient/family/caregiver, ordering medications, labs, and/or procedures and documenting such in the medical record. This does not include time spent on review and interpretation of other tests such as fetal ultrasound or the performance of other procedures such as amniocentesis or CVS.      Dominic Thomas MD, FACOG  Maternal Fetal Medicine, [facility]

## 2024-08-12 NOTE — PROGRESS NOTES
Chief Complaint   Patient presents with    Routine Prenatal Visit     US done today       HPI: Tanja is a  currently at 32w0d who today reports the following:  Contractions - No; Leaking - No; Vaginal bleeding -  No; Swelling of extremities - YES BLE and BUE, but goes away with rest.   She reports good fetal movement. She was seen today in [facility] for her hx of 3 prior c/s and concern for thin lower uterine segment. Reiterated Dr Thomas's advice for having a low threshold to present to L&D with increasing ctx or lower abd discomfort. Additionally discussed hx pre-eclampsia and the role of ASA 81 mg - she declines that at this time.     ROS:  GI: Nausea - No; Constipation - No; Diarrhea - No    Neuro: Headache - No; Visual change - No    Respiratory: Cough - No; SOB - No; fever - No     EXAM:  Vitals: See prenatal flowsheet   Abdomen: See prenatal flowsheet   Urine glucose/protein: See prenatal flowsheet   Pelvic: See prenatal flowsheet   MDM:   Impression: Supervision of high risk pregnancy  Previous C/S with planned repeat C/S  AMA - declines testing   Tests done today: BPP -    Topics discussed: Prenatal labs reviewed  Continue with Rx prenatal vitamins.   labor signs and symptoms  Symptoms of preeclampsia   Tests scheduled today for her next visit:   none

## 2024-08-12 NOTE — PROGRESS NOTES
Patient denies bleeding, leaking fluid or contractions  NIPT declined, AFP declined  Patients next follow up with Dr. Jacob is today

## 2024-08-12 NOTE — ASSESSMENT & PLAN NOTE
Patient with a history of 3 prior C-sections and concern for uterine window at the time of anatomy ultrasound.  Today's ultrasound is concerning for thin lower uterine segment/uterine window though no obvious outpouching of the lower uterine segment..  We discussed careful return precautions regarding cramping or contractions.  We discussed that given this finding and 3 prior  sections would recommend delivery during the 36 or 37th week of pregnancy.  We discussed weekly NSTs as a method of contraction monitoring. We discussed that there would be a low threshold for delivery after 34 weeks of patient came in with concerns for  labor.

## 2024-08-12 NOTE — LETTER
"2024       No Recipients    Patient: Tanja Calloway   YOB: 1985   Date of Visit: 2024       Dear Janes Jacob MD,    Thank you for referring Tanja Calloway to me for evaluation. Below is a copy of my consult note.    If you have questions, please do not hesitate to call me. I look forward to following Tanja along with you.         Sincerely,        Dominic Thomas MD        CC:   No Recipients    Patient denies bleeding, leaking fluid or contractions  NIPT declined, AFP declined  Patients next follow up with Dr. Jacob is today        Maternal/Fetal Medicine Consult Note   Date: 2024  Name: Tanja Calloway    : 1985     MRN: [id number]     Referring Provider: Janes Jacob MD    Chief Complaint  thin lower uterine section, prev C/S x 3, AMA    Subjective     History of Present Illness:  Tanja Calloway is a 38 y.o.  32w0d who presents today for thin NAVNEET    CEE: Estimated Date of Delivery: 10/7/24     ROS:   Otherwise Noted in HPI      Current Outpatient Medications:   •  Prenatal Vit-Fe Fumarate-FA (prenatal vitamin 27-0.8) 27-0.8 MG tablet tablet, Take  by mouth Daily., Disp: , Rfl:     Objective     Vital Signs  /70   Wt 102 kg (225 lb)   LMP 2024   Estimated body mass index is 37.44 kg/m² as calculated from the following:    Height as of 23: 165.1 cm (65\").    Weight as of this encounter: 102 kg (225 lb).    Ultrasound Impression:   See Viewpoint     Assessment and Plan     Tanja Calloway is a 38 y.o.  32w0d who presents today for thin NAVNEET    Diagnoses and all orders for this visit:    1. Previous C/S x 3 - uterine window @ 20 week scan (Primary)  Assessment & Plan:  Patient with a history of 3 prior C-sections and concern for uterine window at the time of anatomy ultrasound.  Today's ultrasound is concerning for thin lower uterine segment/uterine window though no obvious outpouching of the lower uterine " segment..  We discussed careful return precautions regarding cramping or contractions.  We discussed that given this finding and 3 prior  sections would recommend delivery during the 36 or 37th week of pregnancy.  We discussed weekly NSTs as a method of contraction monitoring. We discussed that there would be a low threshold for delivery after 34 weeks of patient came in with concerns for  labor.            Follow Up  No follow-ups on file.    I spent 10 minutes caring for the patient on the day of service. This included: obtaining or reviewing a separately obtained medical history, reviewing patient records, performing a medically appropriate exam and/or evaluation, counseling or educating the patient/family/caregiver, ordering medications, labs, and/or procedures and documenting such in the medical record. This does not include time spent on review and interpretation of other tests such as fetal ultrasound or the performance of other procedures such as amniocentesis or CVS.      Dominic Tohmas MD, FACOG  Maternal Fetal Medicine, [facility]

## 2024-08-26 ENCOUNTER — ROUTINE PRENATAL (OUTPATIENT)
Dept: OBSTETRICS AND GYNECOLOGY | Facility: CLINIC | Age: 39
End: 2024-08-26
Payer: MEDICAID

## 2024-08-26 VITALS — BODY MASS INDEX: 37.61 KG/M2 | WEIGHT: 226 LBS | SYSTOLIC BLOOD PRESSURE: 130 MMHG | DIASTOLIC BLOOD PRESSURE: 82 MMHG

## 2024-08-26 DIAGNOSIS — O09.523 MULTIGRAVIDA OF ADVANCED MATERNAL AGE IN THIRD TRIMESTER: ICD-10-CM

## 2024-08-26 DIAGNOSIS — O34.219 PREVIOUS CESAREAN DELIVERY AFFECTING PREGNANCY: Primary | ICD-10-CM

## 2024-08-26 PROCEDURE — 99214 OFFICE O/P EST MOD 30 MIN: CPT | Performed by: OBSTETRICS & GYNECOLOGY

## 2024-08-26 NOTE — PROGRESS NOTES
Chief Complaint   Patient presents with    Routine Prenatal Visit       HPI: Tanja is a  currently at 34w0d who today reports the following:  Contractions - No; Leaking - No; Vaginal bleeding -  No; Swelling of extremities - No.  Having bilateral calf cramps    ROS:  GI: Nausea - No; Constipation - No; Diarrhea - No    Neuro: Headache - No; Visual change - No    Respiratory: Cough - No; SOB - No; fever - No     EXAM:  Vitals: See prenatal flowsheet   Abdomen: See prenatal flowsheet   Urine glucose/protein: See prenatal flowsheet   Pelvic: See prenatal flowsheet   MDM:   Impression: Supervision of high risk pregnancy  Previous C/S with planned repeat C/S  AMA - declines testing  Calf cramps   Tests done today: none   Topics discussed: Prenatal labs reviewed  Continue with Rx prenatal vitamins.  No additional counseling given - she has no specific complaints or concerns  Would recommend twice daily calf stretches   Tests scheduled today for her next visit:   none

## 2024-09-11 ENCOUNTER — ROUTINE PRENATAL (OUTPATIENT)
Dept: OBSTETRICS AND GYNECOLOGY | Facility: CLINIC | Age: 39
End: 2024-09-11
Payer: MEDICAID

## 2024-09-11 VITALS — SYSTOLIC BLOOD PRESSURE: 128 MMHG | DIASTOLIC BLOOD PRESSURE: 82 MMHG | BODY MASS INDEX: 38.27 KG/M2 | WEIGHT: 230 LBS

## 2024-09-11 DIAGNOSIS — O34.219 PREVIOUS CESAREAN DELIVERY AFFECTING PREGNANCY: Primary | ICD-10-CM

## 2024-09-11 DIAGNOSIS — Z30.2 REQUEST FOR STERILIZATION: ICD-10-CM

## 2024-09-11 LAB
GLUCOSE UR STRIP-MCNC: NEGATIVE MG/DL
PROT UR STRIP-MCNC: NEGATIVE MG/DL

## 2024-09-11 PROCEDURE — 99213 OFFICE O/P EST LOW 20 MIN: CPT | Performed by: OBSTETRICS & GYNECOLOGY

## 2024-09-11 NOTE — PROGRESS NOTES
Chief Complaint   Patient presents with    Routine Prenatal Visit       HPI: Tanja is a  currently at 36w2d who today reports the following:  Contractions - No; Leaking - No; Vaginal bleeding -  No; Swelling of extremities - No.    ROS:  GI: Nausea - No; Constipation - No; Diarrhea - No    Neuro: Headache - No; Visual change - No    Respiratory: Cough - No; SOB - No; fever - No     EXAM:  Vitals: See prenatal flowsheet   Abdomen: See prenatal flowsheet   Urine glucose/protein: See prenatal flowsheet   Pelvic: See prenatal flowsheet   MDM:   Impression: Supervision of high risk pregnancy  Previous C/S with planned repeat C/S early related to uterine window seen at prior studies   Tests done today: Urine dip for protein and glucose   Topics discussed: Prenatal labs reviewed  Continue with Rx prenatal vitamins.  ERAS - acetaminophen and Gatorade/G2  No shaving in advanced of scheduled    Tests scheduled today for her next visit:   TANVI

## 2024-09-12 ENCOUNTER — TELEPHONE (OUTPATIENT)
Dept: OBSTETRICS AND GYNECOLOGY | Facility: CLINIC | Age: 39
End: 2024-09-12
Payer: MEDICAID

## 2024-09-12 NOTE — TELEPHONE ENCOUNTER
Pt called stating that she has a low grade fever of 99.8 and has to be at pre-admin tomorrow and doesn't want to reschedule her surgery so she needs a nurse to give her a call

## 2024-09-13 ENCOUNTER — PREP FOR SURGERY (OUTPATIENT)
Dept: OTHER | Facility: HOSPITAL | Age: 39
End: 2024-09-13
Payer: MEDICAID

## 2024-09-13 ENCOUNTER — PRE-ADMISSION TESTING (OUTPATIENT)
Dept: PREADMISSION TESTING | Facility: HOSPITAL | Age: 39
End: 2024-09-13
Payer: MEDICAID

## 2024-09-13 ENCOUNTER — TELEPHONE (OUTPATIENT)
Dept: OBSTETRICS AND GYNECOLOGY | Facility: CLINIC | Age: 39
End: 2024-09-13
Payer: MEDICAID

## 2024-09-13 VITALS — BODY MASS INDEX: 37.8 KG/M2 | HEIGHT: 65 IN | WEIGHT: 226.85 LBS

## 2024-09-13 DIAGNOSIS — Z30.2 REQUEST FOR STERILIZATION: ICD-10-CM

## 2024-09-13 DIAGNOSIS — O09.299 HX OF PREECLAMPSIA, PRIOR PREGNANCY, CURRENTLY PREGNANT: ICD-10-CM

## 2024-09-13 DIAGNOSIS — O09.522 MULTIGRAVIDA OF ADVANCED MATERNAL AGE IN SECOND TRIMESTER: ICD-10-CM

## 2024-09-13 DIAGNOSIS — O34.219 PREVIOUS CESAREAN DELIVERY AFFECTING PREGNANCY: Primary | ICD-10-CM

## 2024-09-13 DIAGNOSIS — O34.219 PREVIOUS CESAREAN DELIVERY AFFECTING PREGNANCY: ICD-10-CM

## 2024-09-13 LAB
ABO GROUP BLD: NORMAL
BLD GP AB SCN SERPL QL: NEGATIVE
DEPRECATED RDW RBC AUTO: 48.2 FL (ref 37–54)
ERYTHROCYTE [DISTWIDTH] IN BLOOD BY AUTOMATED COUNT: 13.8 % (ref 12.3–15.4)
HCT VFR BLD AUTO: 36.6 % (ref 34–46.6)
HGB BLD-MCNC: 12.4 G/DL (ref 12–15.9)
MCH RBC QN AUTO: 32.7 PG (ref 26.6–33)
MCHC RBC AUTO-ENTMCNC: 33.9 G/DL (ref 31.5–35.7)
MCV RBC AUTO: 96.6 FL (ref 79–97)
PLATELET # BLD AUTO: 170 10*3/MM3 (ref 140–450)
PMV BLD AUTO: 10.1 FL (ref 6–12)
RBC # BLD AUTO: 3.79 10*6/MM3 (ref 3.77–5.28)
RH BLD: POSITIVE
T&S EXPIRATION DATE: NORMAL
TREPONEMA PALLIDUM IGG+IGM AB [PRESENCE] IN SERUM OR PLASMA BY IMMUNOASSAY: NORMAL
WBC NRBC COR # BLD AUTO: 7.8 10*3/MM3 (ref 3.4–10.8)

## 2024-09-13 PROCEDURE — 86850 RBC ANTIBODY SCREEN: CPT

## 2024-09-13 PROCEDURE — 86780 TREPONEMA PALLIDUM: CPT | Performed by: OBSTETRICS & GYNECOLOGY

## 2024-09-13 PROCEDURE — 86900 BLOOD TYPING SEROLOGIC ABO: CPT

## 2024-09-13 PROCEDURE — 85027 COMPLETE CBC AUTOMATED: CPT

## 2024-09-13 PROCEDURE — 86901 BLOOD TYPING SEROLOGIC RH(D): CPT

## 2024-09-13 PROCEDURE — 36415 COLL VENOUS BLD VENIPUNCTURE: CPT

## 2024-09-13 RX ORDER — METHYLERGONOVINE MALEATE 0.2 MG/ML
200 INJECTION INTRAVENOUS ONCE AS NEEDED
Status: CANCELLED | OUTPATIENT
Start: 2024-09-13 | End: 2024-09-14

## 2024-09-13 RX ORDER — KETOROLAC TROMETHAMINE 30 MG/ML
30 INJECTION, SOLUTION INTRAMUSCULAR; INTRAVENOUS ONCE
Status: CANCELLED | OUTPATIENT
Start: 2024-09-13 | End: 2024-09-13

## 2024-09-13 RX ORDER — SODIUM CHLORIDE, SODIUM LACTATE, POTASSIUM CHLORIDE, CALCIUM CHLORIDE 600; 310; 30; 20 MG/100ML; MG/100ML; MG/100ML; MG/100ML
125 INJECTION, SOLUTION INTRAVENOUS CONTINUOUS
Status: CANCELLED | OUTPATIENT
Start: 2024-09-13

## 2024-09-13 RX ORDER — PROMETHAZINE HYDROCHLORIDE 12.5 MG/1
12.5 SUPPOSITORY RECTAL EVERY 6 HOURS PRN
Status: CANCELLED | OUTPATIENT
Start: 2024-09-13

## 2024-09-13 RX ORDER — SODIUM CHLORIDE 0.9 % (FLUSH) 0.9 %
10 SYRINGE (ML) INJECTION EVERY 12 HOURS SCHEDULED
Status: CANCELLED | OUTPATIENT
Start: 2024-09-13

## 2024-09-13 RX ORDER — OXYTOCIN/0.9 % SODIUM CHLORIDE 30/500 ML
650 PLASTIC BAG, INJECTION (ML) INTRAVENOUS ONCE
Status: CANCELLED | OUTPATIENT
Start: 2024-09-13

## 2024-09-13 RX ORDER — CITRIC ACID/SODIUM CITRATE 334-500MG
30 SOLUTION, ORAL ORAL ONCE
Status: CANCELLED | OUTPATIENT
Start: 2024-09-13 | End: 2024-09-13

## 2024-09-13 RX ORDER — OXYTOCIN/0.9 % SODIUM CHLORIDE 30/500 ML
85 PLASTIC BAG, INJECTION (ML) INTRAVENOUS ONCE
Status: CANCELLED | OUTPATIENT
Start: 2024-09-13

## 2024-09-13 RX ORDER — PROMETHAZINE HYDROCHLORIDE 12.5 MG/1
12.5 TABLET ORAL EVERY 6 HOURS PRN
Status: CANCELLED | OUTPATIENT
Start: 2024-09-13

## 2024-09-13 RX ORDER — BUPIVACAINE HCL/0.9 % NACL/PF 0.1 %
2 PLASTIC BAG, INJECTION (ML) EPIDURAL ONCE
Status: CANCELLED | OUTPATIENT
Start: 2024-09-13 | End: 2024-09-13

## 2024-09-13 RX ORDER — ACETAMINOPHEN 500 MG
1000 TABLET ORAL ONCE
Status: CANCELLED | OUTPATIENT
Start: 2024-09-13 | End: 2024-09-13

## 2024-09-13 RX ORDER — SODIUM CHLORIDE 9 MG/ML
40 INJECTION, SOLUTION INTRAVENOUS AS NEEDED
Status: CANCELLED | OUTPATIENT
Start: 2024-09-13

## 2024-09-13 RX ORDER — MISOPROSTOL 200 UG/1
800 TABLET ORAL AS NEEDED
Status: CANCELLED | OUTPATIENT
Start: 2024-09-13

## 2024-09-13 RX ORDER — LIDOCAINE HYDROCHLORIDE 10 MG/ML
0.5 INJECTION, SOLUTION EPIDURAL; INFILTRATION; INTRACAUDAL; PERINEURAL ONCE AS NEEDED
Status: CANCELLED | OUTPATIENT
Start: 2024-09-13

## 2024-09-13 RX ORDER — CARBOPROST TROMETHAMINE 250 UG/ML
250 INJECTION, SOLUTION INTRAMUSCULAR AS NEEDED
Status: CANCELLED | OUTPATIENT
Start: 2024-09-13

## 2024-09-13 RX ORDER — SODIUM CHLORIDE 0.9 % (FLUSH) 0.9 %
10 SYRINGE (ML) INJECTION AS NEEDED
Status: CANCELLED | OUTPATIENT
Start: 2024-09-13

## 2024-09-13 NOTE — TELEPHONE ENCOUNTER
Called and spoke with patient.  She states on Wednesday evening she had a temperature of 99.8F, temperature is back to normal now.  She states she was having congestion with a bit of a cough.  Symptoms have gotten better since Wednesday, but still slowly lingering.  Symptoms began late on Wednesday evening.  Patient has not been tested for any URIs.  Normal and active fetal movement.  She states that in the last couple week she has had some 'pangs' of discomfort (lower abdominal from patient description?) very occasionally in the last week (she remembers two specific instances) that seems to happen just if she has lifted something heavy or turned/moved in a certain way.  No pelvic pain/cramping. No vaginal spotting/bleeding/fluid leakage.  Patient is scheduled for   and is wondering if the reported symptoms will change anything.

## 2024-09-13 NOTE — TELEPHONE ENCOUNTER
PAT nurse advised that patient will be given a call back from clinical team on previous phone encounter with next steps from provider about patient symptoms. Please advise.

## 2024-09-13 NOTE — PAT
An arrival time for procedure was not provided during PAT visit. If patient had any questions or concerns about their arrival time, they were instructed to contact their surgeon/physician.  Additionally, if the patient referred to an arrival time that was acquired from their my chart account, patient was encouraged to verify that time with their surgeon/physician. Arrival times are NOT provided in Pre Admission Testing Department.    Blood bank bracelet applied to patient during Pre Admission Testing visit.  Patient instructed not to remove from arm until after procedure and they are discharged from the hospital.  Explained to patient that they may shower and get the bracelet wet, but not to immerse under water for longer periods (bathing, swimming, hand dishwashing, etc).  Patient verbalized understanding.    Patient instructed to drink 20 ounces of Gatorade or Gatorlyte (if diabetic) and it needs to be completed 1 hour (for Main OR patients) or 2 hours (scheduled  section & [facility] patients) before given arrival time for procedure (NO RED Gatorade and NO Gatorade Zero).    Patient verbalized understanding.    Patient denies any current skin issues.     Patient to apply Chlorhexadine wipes  to surgical area (as instructed) the night before procedure and the AM of procedure. Wipes provided.    Instructed patient to take two Tylenol extra strength (total of 1000 mg) the night before surgery.    Patient viewed general PAT education video as instructed in their preoperative information received from their surgeon.  Patient stated the general PAT education video was viewed in its entirety and survey completed.  Copies of PAT general education handouts (Incentive Spirometry, Meds to Beds Program, Patient Belongings, Pre-op skin preparation instructions, Blood Glucose testing, Visitor policy, Surgery FAQ, Code H) distributed to patient if not printed. Education related to the PAT pass and skin preparation for surgery  (if applicable) completed in PAT as a reinforcement to PAT education video. Patient instructed to return PAT pass provided today as well as completed skin preparation sheet (if applicable) on the day of procedure.     Additionally if patient had not viewed video yet but intended to view it at home or in our waiting area, then referred them to the handout with QR code/link provided during PAT visit.  Encouraged patient/family to read PAT general education handouts thoroughly and notify PAT staff with any questions or concerns. Patient verbalized understanding of all information and priority content.

## 2024-09-13 NOTE — TELEPHONE ENCOUNTER
We should be able to just continue with the  at the scheduled time.  Given the reason for the  I think delay would not be in her best interest

## 2024-09-13 NOTE — DISCHARGE INSTRUCTIONS
What to know before your arrive:    -Do not eat, drink or chew gum beginning 8 hours before your scheduled arrival time to the hospital.  Except please drink 20 ounces of Gatorade and complete two hours before your given arrival time to the hospital.  If you drink too close to surgery time, your sugery may  be delayed or cancelled.  Please complete as instructed.     -Do not shave any part of your body including abdomen or pelvic are for two days before your procedure.  -If you are taking a scheduled medication (insulin, blood pressure medicine,antibiotics) please consult with your physician whether to take on the day of surgery.  -Remove all jewelry including rings, wedding bands, and piercing before coming to the hospital.  -Leave important valuables at home.  -Do not wear dark fingernail polish.  -Please take two Tylenol 500 mg tablets the evening before surgery.  -Bring the following with you to the hospital:    -Picture ID and insurance, Medicare or Medicaid cards    -Co-pay/deductible required by insurance (Cash, Check, Credit Card)    -Copy of living will or power  document (if applicable)    -CPAP mask and tubing, not machine (if applicable)    -Skin prep instructions sheet    What to know the day of procedure:    -Park in the [facility], take elevator for first floor, exit to the right and  proceed through the doors to outside, follow the covered sidewalk to the entrance of the [place], [facility] and [facility], enter the [place] to your right BEFORE entering the 1720 lobby.  Take the elevators to the 3rd floor ([address] [place]).  -Leave unnecessary items in your vehicle, including your suitcase.  Your support  person or a family member can get it for you after your procedure.   -Check in at the reception desk in the lobby of the 3rd floor ([address] [place]).   -One person may accompany you to the pre-op/recovery area.  Please have  other family members wait in the  waiting room.   -An anesthesiologist will meet with your prior to your procedure.   -After anesthesia has been initiated, one person may accompany you in the  operating room.   -No video cameras are permitted in the operating room; only still cameras,  Please.      What to expect while you are in recovery:     -One person may stay with you while you are in recovery.   -If the baby is stable, he/she may visit to initiate breastfeeding & [facility].      CHLORHEXIDINE GLUCONATE WIPES AND INSTRUCTIONS GIVEN TO PATIENT

## 2024-09-13 NOTE — TELEPHONE ENCOUNTER
Called and spoke with patient and informed patient of Dr. Jacob' response and advisement, she verified understanding.

## 2024-09-14 ENCOUNTER — PREP FOR SURGERY (OUTPATIENT)
Dept: OTHER | Facility: HOSPITAL | Age: 39
End: 2024-09-14
Payer: MEDICAID

## 2024-09-14 NOTE — H&P
Tanja Calloway  : 1985  MRN: [id number]  CSN: [id number]    History and Physical    Subjective   Tanja Calloway is a 38 y.o. year old  with an Estimated Date of Delivery: 10/7/24 scheduled for  delivery due to previous C/S - not a  candidate.  Her placental location is anterior.  She is planning for sterilization at the time of the .    Prenatal care has been with Dr. Jacob.  It has been noted for 3 prior  sections and a uterine window seen at 20 weeks.  Recommendations for early  birth have been made in conjunction with perinatology.  She has declined testing related to advanced maternal age.    OB History    Para Term  AB Living   7 3 3 0 3 3   SAB IAB Ectopic Molar Multiple Live Births   3 0 0 0 0 3      # Outcome Date GA Lbr Pool/2nd Weight Sex Type Anes PTL Lv   7 Current            6 SAB 2022           5 SAB 2021           4 Term 10/07/20   3742 g (8 lb 4 oz) M CS-Unspec   NATALIYA      Complications: Delivery by elective  section      Name: Tu   3 SAB 10/2019           2 Term 19   3856 g (8 lb 8 oz) F CS-Unspec   NATALIYA      Complications: Delivery by elective  section      Name: Holley   1 Term 17 41w0d  3799 g (8 lb 6 oz) M CS-Unspec   NATALIYA      Complications: Failed induction of labor      Name: Padmaja      Obstetric Comments   2017 - Chao (FOB #1)   2019 - Holley (FOB #1)    - Tu (FOB #1)   Current (FOB #1)     Past Medical History:   Diagnosis Date    Hypothyroidism 2007    Been in the normal range for long time now    Preeclampsia in postpartum period     Preeclampsia in postpartum period      Past Surgical History:   Procedure Laterality Date     SECTION PRIMARY  2017     SECTION  2019    DILATATION AND CURETTAGE  10/02/2019     SECTION  10/07/2020       Current Outpatient Medications:     Prenatal Vit-Fe Fumarate-FA (prenatal vitamin  27-0.8) 27-0.8 MG tablet tablet, Take  by mouth Daily., Disp: , Rfl:     Allergies   Allergen Reactions    Haemophilus Influenzae Vaccines Rash       Social History    Tobacco Use      Smoking status: Never      Smokeless tobacco: Never      Tobacco comments: None    Review of Systems   Constitutional:  Negative for unexpected weight change.   Respiratory:  Negative for cough and shortness of breath.    Cardiovascular:  Negative for chest pain.   Gastrointestinal:  Negative for abdominal distention, constipation and diarrhea.        No persistent bloating   Genitourinary:  Negative for difficulty urinating, dysuria, hematuria and urgency.        No significant incontinence   Skin:  Negative for rash.   Neurological:  Negative for headaches.         Objective   LMP 2024   General: well developed; well nourished  no acute distress  mentation appropriate   Heart: Not performed.   Lungs: breathing is unlabored   Abdomen: soft, non-tender; no masses  no umbilical or inguinal hernias are present  no hepato-splenomegaly     Prenatal Labs  Lab Results   Component Value Date    HGB 12.4 2024    RUBELLAABIGG 24.20 2024    HEPBSAG Non-Reactive 2024    ABSCRN Negative 2024    [id number] Non-Reactive 2024    HEPCVIRUSABY Non-Reactive 2024     2024    URINECX 50,000 CFU/mL Normal Urogenital Eliza 2024    CHLAMNAA Negative 2024    NGONORRHON Negative 2024       Recent Labs  Lab Results   Component Value Date    HGB 12.4 2024    HCT 36.6 2024    WBC 7.80 2024     2024           Assessment   IUP with an Estimated Date of Delivery: 10/7/24  Planned  section for previous C/S - not a  candidate  Uterine window by ultrasound  Desires permanent sterilization  Advanced maternal age declines genetic testing     Plan   Repeat  with bilateral salpingectomy  ABx and DVT prophylaxis    Janes Jacob MD  2024

## 2024-09-16 ENCOUNTER — ANESTHESIA EVENT (OUTPATIENT)
Dept: LABOR AND DELIVERY | Facility: HOSPITAL | Age: 39
End: 2024-09-16
Payer: MEDICAID

## 2024-09-16 ENCOUNTER — ANESTHESIA (OUTPATIENT)
Dept: LABOR AND DELIVERY | Facility: HOSPITAL | Age: 39
End: 2024-09-16
Payer: MEDICAID

## 2024-09-16 ENCOUNTER — HOSPITAL ENCOUNTER (INPATIENT)
Facility: HOSPITAL | Age: 39
LOS: 3 days | Discharge: HOME | End: 2024-09-19
Attending: OBSTETRICS & GYNECOLOGY | Admitting: OBSTETRICS & GYNECOLOGY
Payer: MEDICAID

## 2024-09-16 DIAGNOSIS — Z30.2 REQUEST FOR STERILIZATION: ICD-10-CM

## 2024-09-16 DIAGNOSIS — O34.219 PREVIOUS CESAREAN DELIVERY AFFECTING PREGNANCY: ICD-10-CM

## 2024-09-16 PROBLEM — R82.5 POSITIVE URINE DRUG SCREEN: Status: RESOLVED | Noted: 2024-05-17 | Resolved: 2024-09-16

## 2024-09-16 PROBLEM — O09.529 AMA (ADVANCED MATERNAL AGE) MULTIGRAVIDA 35+: Status: RESOLVED | Noted: 2024-04-14 | Resolved: 2024-09-16

## 2024-09-16 PROBLEM — O09.299 HX OF PREECLAMPSIA, PRIOR PREGNANCY, CURRENTLY PREGNANT: Status: RESOLVED | Noted: 2024-04-18 | Resolved: 2024-09-16

## 2024-09-16 PROCEDURE — 59025 FETAL NON-STRESS TEST: CPT

## 2024-09-16 PROCEDURE — 25010000002 ONDANSETRON PER 1 MG: Performed by: ANESTHESIOLOGY

## 2024-09-16 PROCEDURE — 25010000002 KETOROLAC TROMETHAMINE PER 15 MG: Performed by: OBSTETRICS & GYNECOLOGY

## 2024-09-16 PROCEDURE — 25010000002 FENTANYL CITRATE (PF) 100 MCG/2ML SOLUTION: Performed by: ANESTHESIOLOGY

## 2024-09-16 PROCEDURE — 25010000002 CEFAZOLIN PER 500 MG: Performed by: OBSTETRICS & GYNECOLOGY

## 2024-09-16 PROCEDURE — 25010000002 ONDANSETRON PER 1 MG: Performed by: OBSTETRICS & GYNECOLOGY

## 2024-09-16 PROCEDURE — 25810000003 LACTATED RINGERS PER 1000 ML: Performed by: OBSTETRICS & GYNECOLOGY

## 2024-09-16 PROCEDURE — 25010000002 PROCHLORPERAZINE 10 MG/2ML SOLUTION: Performed by: OBSTETRICS & GYNECOLOGY

## 2024-09-16 PROCEDURE — 25010000002 METOCLOPRAMIDE PER 10 MG: Performed by: ANESTHESIOLOGY

## 2024-09-16 PROCEDURE — 25010000002 OXYTOCIN PER 10 UNITS: Performed by: ANESTHESIOLOGY

## 2024-09-16 PROCEDURE — 25010000002 MORPHINE PER 10 MG: Performed by: ANESTHESIOLOGY

## 2024-09-16 PROCEDURE — 25810000003 LACTATED RINGERS PER 1000 ML: Performed by: ANESTHESIOLOGY

## 2024-09-16 PROCEDURE — 59515 CESAREAN DELIVERY: CPT | Performed by: OBSTETRICS & GYNECOLOGY

## 2024-09-16 PROCEDURE — 25010000002 BUPIVACAINE IN DEXTROSE 0.75-8.25 % SOLUTION: Performed by: ANESTHESIOLOGY

## 2024-09-16 PROCEDURE — 25810000003 LACTATED RINGERS SOLUTION: Performed by: OBSTETRICS & GYNECOLOGY

## 2024-09-16 RX ORDER — MISOPROSTOL 200 UG/1
800 TABLET ORAL AS NEEDED
Status: DISCONTINUED | OUTPATIENT
Start: 2024-09-16 | End: 2024-09-16 | Stop reason: HOSPADM

## 2024-09-16 RX ORDER — PROMETHAZINE HYDROCHLORIDE 12.5 MG/1
12.5 TABLET ORAL EVERY 6 HOURS PRN
Status: DISCONTINUED | OUTPATIENT
Start: 2024-09-16 | End: 2024-09-16

## 2024-09-16 RX ORDER — HYDROMORPHONE HYDROCHLORIDE 1 MG/ML
0.5 INJECTION, SOLUTION INTRAMUSCULAR; INTRAVENOUS; SUBCUTANEOUS
Status: DISCONTINUED | OUTPATIENT
Start: 2024-09-16 | End: 2024-09-16 | Stop reason: HOSPADM

## 2024-09-16 RX ORDER — CITRIC ACID/SODIUM CITRATE 334-500MG
30 SOLUTION, ORAL ORAL ONCE
Status: DISCONTINUED | OUTPATIENT
Start: 2024-09-16 | End: 2024-09-16 | Stop reason: SDUPTHER

## 2024-09-16 RX ORDER — METHYLERGONOVINE MALEATE 0.2 MG/ML
200 INJECTION INTRAVENOUS ONCE AS NEEDED
Status: DISCONTINUED | OUTPATIENT
Start: 2024-09-16 | End: 2024-09-16 | Stop reason: HOSPADM

## 2024-09-16 RX ORDER — OXYTOCIN 10 [USP'U]/ML
INJECTION, SOLUTION INTRAMUSCULAR; INTRAVENOUS AS NEEDED
Status: DISCONTINUED | OUTPATIENT
Start: 2024-09-16 | End: 2024-09-16 | Stop reason: SURG

## 2024-09-16 RX ORDER — METOCLOPRAMIDE HYDROCHLORIDE 5 MG/ML
INJECTION INTRAMUSCULAR; INTRAVENOUS AS NEEDED
Status: DISCONTINUED | OUTPATIENT
Start: 2024-09-16 | End: 2024-09-16 | Stop reason: SURG

## 2024-09-16 RX ORDER — MORPHINE SULFATE 1 MG/ML
INJECTION, SOLUTION EPIDURAL; INTRATHECAL; INTRAVENOUS AS NEEDED
Status: DISCONTINUED | OUTPATIENT
Start: 2024-09-16 | End: 2024-09-16 | Stop reason: SURG

## 2024-09-16 RX ORDER — PROMETHAZINE HYDROCHLORIDE 25 MG/1
25 TABLET ORAL EVERY 6 HOURS PRN
Status: DISCONTINUED | OUTPATIENT
Start: 2024-09-16 | End: 2024-09-19 | Stop reason: HOSPADM

## 2024-09-16 RX ORDER — KETOROLAC TROMETHAMINE 30 MG/ML
30 INJECTION, SOLUTION INTRAMUSCULAR; INTRAVENOUS ONCE
Status: DISCONTINUED | OUTPATIENT
Start: 2024-09-16 | End: 2024-09-16

## 2024-09-16 RX ORDER — KETOROLAC TROMETHAMINE 15 MG/ML
15 INJECTION, SOLUTION INTRAMUSCULAR; INTRAVENOUS EVERY 6 HOURS
Status: COMPLETED | OUTPATIENT
Start: 2024-09-17 | End: 2024-09-17

## 2024-09-16 RX ORDER — SODIUM CHLORIDE 0.9 % (FLUSH) 0.9 %
10 SYRINGE (ML) INJECTION EVERY 12 HOURS SCHEDULED
Status: DISCONTINUED | OUTPATIENT
Start: 2024-09-16 | End: 2024-09-16 | Stop reason: HOSPADM

## 2024-09-16 RX ORDER — ACETAMINOPHEN 500 MG
1000 TABLET ORAL ONCE
Status: DISCONTINUED | OUTPATIENT
Start: 2024-09-16 | End: 2024-09-16 | Stop reason: SDUPTHER

## 2024-09-16 RX ORDER — PROMETHAZINE HYDROCHLORIDE 12.5 MG/1
12.5 SUPPOSITORY RECTAL EVERY 6 HOURS PRN
Status: DISCONTINUED | OUTPATIENT
Start: 2024-09-16 | End: 2024-09-19 | Stop reason: HOSPADM

## 2024-09-16 RX ORDER — OXYTOCIN/0.9 % SODIUM CHLORIDE 30/500 ML
85 PLASTIC BAG, INJECTION (ML) INTRAVENOUS ONCE
Status: DISCONTINUED | OUTPATIENT
Start: 2024-09-16 | End: 2024-09-16 | Stop reason: HOSPADM

## 2024-09-16 RX ORDER — MISOPROSTOL 200 UG/1
800 TABLET ORAL AS NEEDED
Status: DISCONTINUED | OUTPATIENT
Start: 2024-09-16 | End: 2024-09-16

## 2024-09-16 RX ORDER — PROMETHAZINE HYDROCHLORIDE 12.5 MG/1
12.5 SUPPOSITORY RECTAL EVERY 6 HOURS PRN
Status: DISCONTINUED | OUTPATIENT
Start: 2024-09-16 | End: 2024-09-16 | Stop reason: HOSPADM

## 2024-09-16 RX ORDER — PROCHLORPERAZINE MALEATE 5 MG
5 TABLET ORAL EVERY 6 HOURS PRN
Status: DISCONTINUED | OUTPATIENT
Start: 2024-09-16 | End: 2024-09-19 | Stop reason: HOSPADM

## 2024-09-16 RX ORDER — BUPIVACAINE HCL/0.9 % NACL/PF 0.125 %
PLASTIC BAG, INJECTION (ML) EPIDURAL AS NEEDED
Status: DISCONTINUED | OUTPATIENT
Start: 2024-09-16 | End: 2024-09-16 | Stop reason: SURG

## 2024-09-16 RX ORDER — METHYLERGONOVINE MALEATE 0.2 MG/ML
200 INJECTION INTRAVENOUS ONCE AS NEEDED
Status: DISCONTINUED | OUTPATIENT
Start: 2024-09-16 | End: 2024-09-16

## 2024-09-16 RX ORDER — BUPIVACAINE HYDROCHLORIDE 7.5 MG/ML
INJECTION, SOLUTION INTRASPINAL AS NEEDED
Status: DISCONTINUED | OUTPATIENT
Start: 2024-09-16 | End: 2024-09-16 | Stop reason: SURG

## 2024-09-16 RX ORDER — OXYTOCIN/0.9 % SODIUM CHLORIDE 30/500 ML
650 PLASTIC BAG, INJECTION (ML) INTRAVENOUS ONCE
Status: DISCONTINUED | OUTPATIENT
Start: 2024-09-16 | End: 2024-09-16 | Stop reason: HOSPADM

## 2024-09-16 RX ORDER — LIDOCAINE HYDROCHLORIDE 10 MG/ML
0.5 INJECTION, SOLUTION EPIDURAL; INFILTRATION; INTRACAUDAL; PERINEURAL ONCE AS NEEDED
Status: DISCONTINUED | OUTPATIENT
Start: 2024-09-16 | End: 2024-09-16 | Stop reason: SDUPTHER

## 2024-09-16 RX ORDER — HYDROCORTISONE 25 MG/G
1 CREAM TOPICAL AS NEEDED
Status: DISCONTINUED | OUTPATIENT
Start: 2024-09-16 | End: 2024-09-19 | Stop reason: HOSPADM

## 2024-09-16 RX ORDER — KETOROLAC TROMETHAMINE 30 MG/ML
30 INJECTION, SOLUTION INTRAMUSCULAR; INTRAVENOUS ONCE
Status: COMPLETED | OUTPATIENT
Start: 2024-09-16 | End: 2024-09-16

## 2024-09-16 RX ORDER — SODIUM CHLORIDE, SODIUM LACTATE, POTASSIUM CHLORIDE, CALCIUM CHLORIDE 600; 310; 30; 20 MG/100ML; MG/100ML; MG/100ML; MG/100ML
125 INJECTION, SOLUTION INTRAVENOUS CONTINUOUS
Status: DISCONTINUED | OUTPATIENT
Start: 2024-09-16 | End: 2024-09-16

## 2024-09-16 RX ORDER — CALCIUM CARBONATE 500 MG/1
1 TABLET, CHEWABLE ORAL EVERY 4 HOURS PRN
Status: DISCONTINUED | OUTPATIENT
Start: 2024-09-16 | End: 2024-09-19 | Stop reason: HOSPADM

## 2024-09-16 RX ORDER — PROMETHAZINE HYDROCHLORIDE 12.5 MG/1
12.5 TABLET ORAL EVERY 6 HOURS PRN
Status: DISCONTINUED | OUTPATIENT
Start: 2024-09-16 | End: 2024-09-16 | Stop reason: HOSPADM

## 2024-09-16 RX ORDER — SODIUM CHLORIDE, SODIUM LACTATE, POTASSIUM CHLORIDE, CALCIUM CHLORIDE 600; 310; 30; 20 MG/100ML; MG/100ML; MG/100ML; MG/100ML
125 INJECTION, SOLUTION INTRAVENOUS CONTINUOUS
Status: DISCONTINUED | OUTPATIENT
Start: 2024-09-16 | End: 2024-09-19 | Stop reason: HOSPADM

## 2024-09-16 RX ORDER — SODIUM CHLORIDE 9 MG/ML
40 INJECTION, SOLUTION INTRAVENOUS AS NEEDED
Status: DISCONTINUED | OUTPATIENT
Start: 2024-09-16 | End: 2024-09-16 | Stop reason: SDUPTHER

## 2024-09-16 RX ORDER — SODIUM CHLORIDE, SODIUM LACTATE, POTASSIUM CHLORIDE, CALCIUM CHLORIDE 600; 310; 30; 20 MG/100ML; MG/100ML; MG/100ML; MG/100ML
INJECTION, SOLUTION INTRAVENOUS CONTINUOUS PRN
Status: DISCONTINUED | OUTPATIENT
Start: 2024-09-16 | End: 2024-09-16 | Stop reason: SURG

## 2024-09-16 RX ORDER — PROMETHAZINE HYDROCHLORIDE 12.5 MG/1
12.5 SUPPOSITORY RECTAL EVERY 6 HOURS PRN
Status: DISCONTINUED | OUTPATIENT
Start: 2024-09-16 | End: 2024-09-16

## 2024-09-16 RX ORDER — OXYTOCIN/0.9 % SODIUM CHLORIDE 30/500 ML
125 PLASTIC BAG, INJECTION (ML) INTRAVENOUS ONCE AS NEEDED
Status: DISCONTINUED | OUTPATIENT
Start: 2024-09-16 | End: 2024-09-19 | Stop reason: HOSPADM

## 2024-09-16 RX ORDER — IBUPROFEN 600 MG/1
600 TABLET, FILM COATED ORAL EVERY 6 HOURS
Status: DISCONTINUED | OUTPATIENT
Start: 2024-09-18 | End: 2024-09-19 | Stop reason: HOSPADM

## 2024-09-16 RX ORDER — ACETAMINOPHEN 325 MG/1
650 TABLET ORAL EVERY 6 HOURS
Status: DISCONTINUED | OUTPATIENT
Start: 2024-09-17 | End: 2024-09-19 | Stop reason: HOSPADM

## 2024-09-16 RX ORDER — CARBOPROST TROMETHAMINE 250 UG/ML
250 INJECTION, SOLUTION INTRAMUSCULAR AS NEEDED
Status: DISCONTINUED | OUTPATIENT
Start: 2024-09-16 | End: 2024-09-16 | Stop reason: HOSPADM

## 2024-09-16 RX ORDER — ONDANSETRON 4 MG/1
4 TABLET, ORALLY DISINTEGRATING ORAL EVERY 6 HOURS PRN
Status: DISCONTINUED | OUTPATIENT
Start: 2024-09-16 | End: 2024-09-19 | Stop reason: HOSPADM

## 2024-09-16 RX ORDER — METHYLERGONOVINE MALEATE 0.2 MG/ML
200 INJECTION INTRAVENOUS ONCE AS NEEDED
Status: DISCONTINUED | OUTPATIENT
Start: 2024-09-16 | End: 2024-09-19 | Stop reason: HOSPADM

## 2024-09-16 RX ORDER — PROCHLORPERAZINE 25 MG
25 SUPPOSITORY, RECTAL RECTAL EVERY 12 HOURS PRN
Status: DISCONTINUED | OUTPATIENT
Start: 2024-09-16 | End: 2024-09-19 | Stop reason: HOSPADM

## 2024-09-16 RX ORDER — SODIUM CHLORIDE 0.9 % (FLUSH) 0.9 %
10 SYRINGE (ML) INJECTION AS NEEDED
Status: DISCONTINUED | OUTPATIENT
Start: 2024-09-16 | End: 2024-09-16 | Stop reason: HOSPADM

## 2024-09-16 RX ORDER — ACETAMINOPHEN 500 MG
1000 TABLET ORAL ONCE
Status: DISCONTINUED | OUTPATIENT
Start: 2024-09-16 | End: 2024-09-16 | Stop reason: HOSPADM

## 2024-09-16 RX ORDER — SODIUM CHLORIDE 9 MG/ML
40 INJECTION, SOLUTION INTRAVENOUS AS NEEDED
Status: DISCONTINUED | OUTPATIENT
Start: 2024-09-16 | End: 2024-09-16 | Stop reason: HOSPADM

## 2024-09-16 RX ORDER — OXYTOCIN/0.9 % SODIUM CHLORIDE 30/500 ML
PLASTIC BAG, INJECTION (ML) INTRAVENOUS AS NEEDED
Status: DISCONTINUED | OUTPATIENT
Start: 2024-09-16 | End: 2024-09-16 | Stop reason: SURG

## 2024-09-16 RX ORDER — SODIUM CHLORIDE, SODIUM LACTATE, POTASSIUM CHLORIDE, CALCIUM CHLORIDE 600; 310; 30; 20 MG/100ML; MG/100ML; MG/100ML; MG/100ML
125 INJECTION, SOLUTION INTRAVENOUS CONTINUOUS
Status: DISCONTINUED | OUTPATIENT
Start: 2024-09-16 | End: 2024-09-16 | Stop reason: SDUPTHER

## 2024-09-16 RX ORDER — ONDANSETRON 2 MG/ML
INJECTION INTRAMUSCULAR; INTRAVENOUS AS NEEDED
Status: DISCONTINUED | OUTPATIENT
Start: 2024-09-16 | End: 2024-09-16 | Stop reason: SURG

## 2024-09-16 RX ORDER — FENTANYL CITRATE 50 UG/ML
INJECTION, SOLUTION INTRAMUSCULAR; INTRAVENOUS AS NEEDED
Status: DISCONTINUED | OUTPATIENT
Start: 2024-09-16 | End: 2024-09-16 | Stop reason: SURG

## 2024-09-16 RX ORDER — SIMETHICONE 80 MG
80 TABLET,CHEWABLE ORAL 4 TIMES DAILY PRN
Status: DISCONTINUED | OUTPATIENT
Start: 2024-09-16 | End: 2024-09-19 | Stop reason: HOSPADM

## 2024-09-16 RX ORDER — OXYTOCIN/0.9 % SODIUM CHLORIDE 30/500 ML
85 PLASTIC BAG, INJECTION (ML) INTRAVENOUS ONCE
Status: DISCONTINUED | OUTPATIENT
Start: 2024-09-16 | End: 2024-09-16

## 2024-09-16 RX ORDER — ONDANSETRON 2 MG/ML
4 INJECTION INTRAMUSCULAR; INTRAVENOUS EVERY 6 HOURS PRN
Status: DISCONTINUED | OUTPATIENT
Start: 2024-09-16 | End: 2024-09-19 | Stop reason: HOSPADM

## 2024-09-16 RX ORDER — ACETAMINOPHEN 500 MG
1000 TABLET ORAL EVERY 6 HOURS
Status: DISPENSED | OUTPATIENT
Start: 2024-09-16 | End: 2024-09-17

## 2024-09-16 RX ORDER — OXYCODONE HYDROCHLORIDE 5 MG/1
5 TABLET ORAL EVERY 4 HOURS PRN
Status: DISCONTINUED | OUTPATIENT
Start: 2024-09-16 | End: 2024-09-19 | Stop reason: HOSPADM

## 2024-09-16 RX ORDER — CARBOPROST TROMETHAMINE 250 UG/ML
250 INJECTION, SOLUTION INTRAMUSCULAR AS NEEDED
Status: DISCONTINUED | OUTPATIENT
Start: 2024-09-16 | End: 2024-09-16

## 2024-09-16 RX ORDER — DOCUSATE SODIUM 100 MG/1
100 CAPSULE, LIQUID FILLED ORAL 2 TIMES DAILY PRN
Status: DISCONTINUED | OUTPATIENT
Start: 2024-09-16 | End: 2024-09-17

## 2024-09-16 RX ORDER — ALUMINA, MAGNESIA, AND SIMETHICONE 2400; 2400; 240 MG/30ML; MG/30ML; MG/30ML
15 SUSPENSION ORAL EVERY 4 HOURS PRN
Status: DISCONTINUED | OUTPATIENT
Start: 2024-09-16 | End: 2024-09-19 | Stop reason: HOSPADM

## 2024-09-16 RX ORDER — FAMOTIDINE 10 MG/ML
INJECTION, SOLUTION INTRAVENOUS AS NEEDED
Status: DISCONTINUED | OUTPATIENT
Start: 2024-09-16 | End: 2024-09-16 | Stop reason: SURG

## 2024-09-16 RX ORDER — OXYCODONE HYDROCHLORIDE 10 MG/1
10 TABLET ORAL EVERY 4 HOURS PRN
Status: DISCONTINUED | OUTPATIENT
Start: 2024-09-16 | End: 2024-09-19 | Stop reason: HOSPADM

## 2024-09-16 RX ORDER — CITRIC ACID/SODIUM CITRATE 334-500MG
30 SOLUTION, ORAL ORAL ONCE
Status: COMPLETED | OUTPATIENT
Start: 2024-09-16 | End: 2024-09-16

## 2024-09-16 RX ORDER — PROCHLORPERAZINE EDISYLATE 5 MG/ML
5 INJECTION INTRAMUSCULAR; INTRAVENOUS EVERY 6 HOURS PRN
Status: DISCONTINUED | OUTPATIENT
Start: 2024-09-16 | End: 2024-09-19 | Stop reason: HOSPADM

## 2024-09-16 RX ORDER — MISOPROSTOL 200 UG/1
600 TABLET ORAL ONCE AS NEEDED
Status: DISCONTINUED | OUTPATIENT
Start: 2024-09-16 | End: 2024-09-19 | Stop reason: HOSPADM

## 2024-09-16 RX ORDER — EPHEDRINE SULFATE 5 MG/ML
INJECTION INTRAVENOUS AS NEEDED
Status: DISCONTINUED | OUTPATIENT
Start: 2024-09-16 | End: 2024-09-16 | Stop reason: SURG

## 2024-09-16 RX ORDER — ENOXAPARIN SODIUM 100 MG/ML
40 INJECTION SUBCUTANEOUS NIGHTLY
Status: DISCONTINUED | OUTPATIENT
Start: 2024-09-17 | End: 2024-09-19 | Stop reason: HOSPADM

## 2024-09-16 RX ORDER — CARBOPROST TROMETHAMINE 250 UG/ML
250 INJECTION, SOLUTION INTRAMUSCULAR ONCE AS NEEDED
Status: DISCONTINUED | OUTPATIENT
Start: 2024-09-16 | End: 2024-09-19 | Stop reason: HOSPADM

## 2024-09-16 RX ORDER — ONDANSETRON 2 MG/ML
4 INJECTION INTRAMUSCULAR; INTRAVENOUS EVERY 6 HOURS PRN
Status: DISCONTINUED | OUTPATIENT
Start: 2024-09-16 | End: 2024-09-16 | Stop reason: SDUPTHER

## 2024-09-16 RX ORDER — OXYTOCIN/0.9 % SODIUM CHLORIDE 30/500 ML
650 PLASTIC BAG, INJECTION (ML) INTRAVENOUS ONCE
Status: DISCONTINUED | OUTPATIENT
Start: 2024-09-16 | End: 2024-09-16

## 2024-09-16 RX ADMIN — KETOROLAC TROMETHAMINE 30 MG: 30 INJECTION, SOLUTION INTRAMUSCULAR; INTRAVENOUS at 19:30

## 2024-09-16 RX ADMIN — Medication 200 MCG: at 17:17

## 2024-09-16 RX ADMIN — FENTANYL CITRATE 20 MCG: 50 INJECTION, SOLUTION INTRAMUSCULAR; INTRAVENOUS at 17:14

## 2024-09-16 RX ADMIN — EPHEDRINE SULFATE 10 MG: 5 INJECTION INTRAVENOUS at 17:34

## 2024-09-16 RX ADMIN — Medication 200 MCG: at 17:48

## 2024-09-16 RX ADMIN — Medication 100 MCG: at 18:03

## 2024-09-16 RX ADMIN — ONDANSETRON 4 MG: 2 INJECTION INTRAMUSCULAR; INTRAVENOUS at 20:52

## 2024-09-16 RX ADMIN — MORPHINE SULFATE 0.15 MG: 1 INJECTION, SOLUTION EPIDURAL; INTRATHECAL; INTRAVENOUS at 17:14

## 2024-09-16 RX ADMIN — EPHEDRINE SULFATE 5 MG: 5 INJECTION INTRAVENOUS at 17:21

## 2024-09-16 RX ADMIN — SODIUM CHLORIDE 2 G: 900 INJECTION INTRAVENOUS at 16:47

## 2024-09-16 RX ADMIN — SODIUM CITRATE AND CITRIC ACID MONOHYDRATE 30 ML: 500; 334 SOLUTION ORAL at 16:58

## 2024-09-16 RX ADMIN — SODIUM CHLORIDE, POTASSIUM CHLORIDE, SODIUM LACTATE AND CALCIUM CHLORIDE 125 ML/HR: 600; 310; 30; 20 INJECTION, SOLUTION INTRAVENOUS at 15:20

## 2024-09-16 RX ADMIN — PROCHLORPERAZINE EDISYLATE 5 MG: 5 INJECTION INTRAMUSCULAR; INTRAVENOUS at 23:01

## 2024-09-16 RX ADMIN — SODIUM CHLORIDE, POTASSIUM CHLORIDE, SODIUM LACTATE AND CALCIUM CHLORIDE: 600; 310; 30; 20 INJECTION, SOLUTION INTRAVENOUS at 17:05

## 2024-09-16 RX ADMIN — OXYTOCIN 10 UNITS: 10 INJECTION INTRAVENOUS at 17:47

## 2024-09-16 RX ADMIN — BUPIVACAINE HYDROCHLORIDE IN DEXTROSE 1.6 ML: 7.5 INJECTION, SOLUTION SUBARACHNOID at 17:14

## 2024-09-16 RX ADMIN — METOCLOPRAMIDE HYDROCHLORIDE 10 MG: 5 INJECTION INTRAMUSCULAR; INTRAVENOUS at 17:08

## 2024-09-16 RX ADMIN — FAMOTIDINE 20 MG: 10 INJECTION, SOLUTION INTRAVENOUS at 17:08

## 2024-09-16 RX ADMIN — ONDANSETRON 4 MG: 2 INJECTION INTRAMUSCULAR; INTRAVENOUS at 17:08

## 2024-09-16 RX ADMIN — SODIUM CHLORIDE, POTASSIUM CHLORIDE, SODIUM LACTATE AND CALCIUM CHLORIDE 1000 ML: 600; 310; 30; 20 INJECTION, SOLUTION INTRAVENOUS at 16:48

## 2024-09-16 RX ADMIN — Medication 100 MCG: at 17:34

## 2024-09-16 RX ADMIN — Medication 500 ML: at 17:49

## 2024-09-17 LAB
HCT VFR BLD AUTO: 31.2 % (ref 34–46.6)
HGB BLD-MCNC: 10.7 G/DL (ref 12–15.9)

## 2024-09-17 PROCEDURE — 85014 HEMATOCRIT: CPT | Performed by: OBSTETRICS & GYNECOLOGY

## 2024-09-17 PROCEDURE — 0503F POSTPARTUM CARE VISIT: CPT | Performed by: OBSTETRICS & GYNECOLOGY

## 2024-09-17 PROCEDURE — 25010000002 KETOROLAC TROMETHAMINE PER 15 MG: Performed by: OBSTETRICS & GYNECOLOGY

## 2024-09-17 PROCEDURE — 85018 HEMOGLOBIN: CPT | Performed by: OBSTETRICS & GYNECOLOGY

## 2024-09-17 PROCEDURE — 25010000002 ENOXAPARIN PER 10 MG: Performed by: OBSTETRICS & GYNECOLOGY

## 2024-09-17 RX ORDER — DOCUSATE SODIUM 100 MG/1
100 CAPSULE, LIQUID FILLED ORAL 2 TIMES DAILY
Status: DISCONTINUED | OUTPATIENT
Start: 2024-09-18 | End: 2024-09-19 | Stop reason: HOSPADM

## 2024-09-17 RX ORDER — SODIUM CHLORIDE 0.9 % (FLUSH) 0.9 %
3 SYRINGE (ML) INJECTION AS NEEDED
Status: DISCONTINUED | OUTPATIENT
Start: 2024-09-17 | End: 2024-09-19 | Stop reason: HOSPADM

## 2024-09-17 RX ADMIN — ACETAMINOPHEN 1000 MG: 500 TABLET ORAL at 11:04

## 2024-09-17 RX ADMIN — Medication 3 ML: at 08:06

## 2024-09-17 RX ADMIN — DOCUSATE SODIUM 100 MG: 100 CAPSULE, LIQUID FILLED ORAL at 17:50

## 2024-09-17 RX ADMIN — ACETAMINOPHEN 1000 MG: 500 TABLET ORAL at 16:24

## 2024-09-17 RX ADMIN — ENOXAPARIN SODIUM 40 MG: 100 INJECTION SUBCUTANEOUS at 16:25

## 2024-09-17 RX ADMIN — KETOROLAC TROMETHAMINE 15 MG: 15 INJECTION, SOLUTION INTRAMUSCULAR; INTRAVENOUS at 01:34

## 2024-09-17 RX ADMIN — KETOROLAC TROMETHAMINE 15 MG: 15 INJECTION, SOLUTION INTRAMUSCULAR; INTRAVENOUS at 08:05

## 2024-09-17 RX ADMIN — KETOROLAC TROMETHAMINE 15 MG: 15 INJECTION, SOLUTION INTRAMUSCULAR; INTRAVENOUS at 20:57

## 2024-09-17 RX ADMIN — ACETAMINOPHEN 1000 MG: 500 TABLET ORAL at 04:13

## 2024-09-17 RX ADMIN — Medication 3 ML: at 13:07

## 2024-09-17 RX ADMIN — KETOROLAC TROMETHAMINE 15 MG: 15 INJECTION, SOLUTION INTRAMUSCULAR; INTRAVENOUS at 13:07

## 2024-09-18 ENCOUNTER — APPOINTMENT (OUTPATIENT)
Dept: CARDIOLOGY | Facility: HOSPITAL | Age: 39
End: 2024-09-18
Payer: MEDICAID

## 2024-09-18 LAB
BH CV LOW VAS LEFT VARICOSITY AK VESSEL: 1
BH CV LOW VAS LEFT VARICOSITY BK VESSEL: 1
BH CV LOWER VASCULAR LEFT COMMON FEMORAL AUGMENT: NORMAL
BH CV LOWER VASCULAR LEFT COMMON FEMORAL COMPETENT: NORMAL
BH CV LOWER VASCULAR LEFT COMMON FEMORAL COMPRESS: NORMAL
BH CV LOWER VASCULAR LEFT COMMON FEMORAL PHASIC: NORMAL
BH CV LOWER VASCULAR LEFT COMMON FEMORAL SPONT: NORMAL
BH CV LOWER VASCULAR LEFT DISTAL FEMORAL COMPRESS: NORMAL
BH CV LOWER VASCULAR LEFT GASTRONEMIUS COMPRESS: NORMAL
BH CV LOWER VASCULAR LEFT GREATER SAPH AK COMPRESS: NORMAL
BH CV LOWER VASCULAR LEFT GREATER SAPH BK COMPRESS: NORMAL
BH CV LOWER VASCULAR LEFT LESSER SAPH COMPRESS: NORMAL
BH CV LOWER VASCULAR LEFT MID FEMORAL AUGMENT: NORMAL
BH CV LOWER VASCULAR LEFT MID FEMORAL COMPETENT: NORMAL
BH CV LOWER VASCULAR LEFT MID FEMORAL COMPRESS: NORMAL
BH CV LOWER VASCULAR LEFT MID FEMORAL PHASIC: NORMAL
BH CV LOWER VASCULAR LEFT MID FEMORAL SPONT: NORMAL
BH CV LOWER VASCULAR LEFT PERONEAL COMPRESS: NORMAL
BH CV LOWER VASCULAR LEFT POPLITEAL AUGMENT: NORMAL
BH CV LOWER VASCULAR LEFT POPLITEAL COMPETENT: NORMAL
BH CV LOWER VASCULAR LEFT POPLITEAL COMPRESS: NORMAL
BH CV LOWER VASCULAR LEFT POPLITEAL PHASIC: NORMAL
BH CV LOWER VASCULAR LEFT POPLITEAL SPONT: NORMAL
BH CV LOWER VASCULAR LEFT POSTERIOR TIBIAL COMPRESS: NORMAL
BH CV LOWER VASCULAR LEFT PROXIMAL FEMORAL COMPRESS: NORMAL
BH CV LOWER VASCULAR LEFT SAPHENOFEMORAL JUNCTION COMPRESS: NORMAL
BH CV LOWER VASCULAR LEFT VARICOSITY AK COMPRESS: NORMAL
BH CV LOWER VASCULAR LEFT VARICOSITY BK COMPRESS: NORMAL
BH CV LOWER VASCULAR RIGHT COMMON FEMORAL AUGMENT: NORMAL
BH CV LOWER VASCULAR RIGHT COMMON FEMORAL COMPETENT: NORMAL
BH CV LOWER VASCULAR RIGHT COMMON FEMORAL PHASIC: NORMAL
BH CV LOWER VASCULAR RIGHT COMMON FEMORAL SPONT: NORMAL

## 2024-09-18 PROCEDURE — 0503F POSTPARTUM CARE VISIT: CPT | Performed by: STUDENT IN AN ORGANIZED HEALTH CARE EDUCATION/TRAINING PROGRAM

## 2024-09-18 PROCEDURE — 93971 EXTREMITY STUDY: CPT | Performed by: INTERNAL MEDICINE

## 2024-09-18 PROCEDURE — 25010000002 ENOXAPARIN PER 10 MG: Performed by: OBSTETRICS & GYNECOLOGY

## 2024-09-18 PROCEDURE — 93971 EXTREMITY STUDY: CPT

## 2024-09-18 RX ADMIN — ACETAMINOPHEN 650 MG: 325 TABLET ORAL at 04:46

## 2024-09-18 RX ADMIN — ACETAMINOPHEN 650 MG: 325 TABLET ORAL at 16:18

## 2024-09-18 RX ADMIN — ACETAMINOPHEN 650 MG: 325 TABLET ORAL at 22:54

## 2024-09-18 RX ADMIN — IBUPROFEN 600 MG: 600 TABLET, FILM COATED ORAL at 08:24

## 2024-09-18 RX ADMIN — IBUPROFEN 600 MG: 600 TABLET, FILM COATED ORAL at 20:57

## 2024-09-18 RX ADMIN — ACETAMINOPHEN 650 MG: 325 TABLET ORAL at 11:11

## 2024-09-18 RX ADMIN — DOCUSATE SODIUM 100 MG: 100 CAPSULE, LIQUID FILLED ORAL at 20:57

## 2024-09-18 RX ADMIN — ENOXAPARIN SODIUM 40 MG: 100 INJECTION SUBCUTANEOUS at 20:58

## 2024-09-18 RX ADMIN — OXYCODONE HYDROCHLORIDE 5 MG: 5 TABLET ORAL at 16:17

## 2024-09-18 RX ADMIN — DOCUSATE SODIUM 100 MG: 100 CAPSULE, LIQUID FILLED ORAL at 08:24

## 2024-09-18 RX ADMIN — IBUPROFEN 600 MG: 600 TABLET, FILM COATED ORAL at 14:14

## 2024-09-18 RX ADMIN — IBUPROFEN 600 MG: 600 TABLET, FILM COATED ORAL at 02:24

## 2024-09-18 RX ADMIN — OXYCODONE HYDROCHLORIDE 5 MG: 5 TABLET ORAL at 11:11

## 2024-09-18 RX ADMIN — ACETAMINOPHEN 650 MG: 325 TABLET ORAL at 00:16

## 2024-09-19 VITALS
DIASTOLIC BLOOD PRESSURE: 71 MMHG | RESPIRATION RATE: 16 BRPM | TEMPERATURE: 97.7 F | HEIGHT: 65 IN | WEIGHT: 226 LBS | HEART RATE: 75 BPM | OXYGEN SATURATION: 100 % | SYSTOLIC BLOOD PRESSURE: 120 MMHG | BODY MASS INDEX: 37.65 KG/M2

## 2024-09-19 PROCEDURE — 0503F POSTPARTUM CARE VISIT: CPT | Performed by: OBSTETRICS & GYNECOLOGY

## 2024-09-19 RX ORDER — FERROUS SULFATE 325(65) MG
325 TABLET ORAL
Qty: 100 TABLET | Refills: 0 | Status: SHIPPED | OUTPATIENT
Start: 2024-09-19

## 2024-09-19 RX ORDER — ENOXAPARIN SODIUM 100 MG/ML
40 INJECTION SUBCUTANEOUS NIGHTLY
Qty: 12 ML | Refills: 0 | Status: SHIPPED | OUTPATIENT
Start: 2024-09-19 | End: 2024-10-19

## 2024-09-19 RX ORDER — PSEUDOEPHEDRINE HCL 30 MG
100 TABLET ORAL 2 TIMES DAILY PRN
Qty: 60 CAPSULE | Refills: 0 | Status: SHIPPED | OUTPATIENT
Start: 2024-09-19

## 2024-09-19 RX ORDER — IBUPROFEN 600 MG/1
600 TABLET, FILM COATED ORAL EVERY 6 HOURS PRN
Qty: 90 TABLET | Refills: 0 | Status: SHIPPED | OUTPATIENT
Start: 2024-09-19

## 2024-09-19 RX ORDER — ACETAMINOPHEN 325 MG/1
650 TABLET ORAL EVERY 6 HOURS PRN
Qty: 100 TABLET | Refills: 0 | Status: SHIPPED | OUTPATIENT
Start: 2024-09-19

## 2024-09-19 RX ADMIN — DOCUSATE SODIUM 100 MG: 100 CAPSULE, LIQUID FILLED ORAL at 08:07

## 2024-09-19 RX ADMIN — IBUPROFEN 600 MG: 600 TABLET, FILM COATED ORAL at 02:41

## 2024-09-19 RX ADMIN — IBUPROFEN 600 MG: 600 TABLET, FILM COATED ORAL at 08:07

## 2024-09-19 RX ADMIN — ACETAMINOPHEN 650 MG: 325 TABLET ORAL at 05:15

## 2024-09-19 RX ADMIN — ACETAMINOPHEN 650 MG: 325 TABLET ORAL at 11:32

## 2024-09-26 ENCOUNTER — HOSPITAL ENCOUNTER (INPATIENT)
Facility: HOSPITAL | Age: 39
LOS: 1 days | Discharge: HOME | End: 2024-09-27
Attending: OBSTETRICS & GYNECOLOGY | Admitting: OBSTETRICS & GYNECOLOGY
Payer: MEDICAID

## 2024-09-26 ENCOUNTER — TELEPHONE (OUTPATIENT)
Dept: OBSTETRICS AND GYNECOLOGY | Facility: CLINIC | Age: 39
End: 2024-09-26
Payer: MEDICAID

## 2024-09-26 LAB
ABO GROUP BLD: NORMAL
ALP SERPL-CCNC: 105 U/L (ref 39–117)
ALT SERPL W P-5'-P-CCNC: 52 U/L (ref 1–33)
AST SERPL-CCNC: 32 U/L (ref 1–32)
BILIRUB SERPL-MCNC: 0.5 MG/DL (ref 0–1.2)
BLD GP AB SCN SERPL QL: NEGATIVE
CREAT SERPL-MCNC: 0.62 MG/DL (ref 0.57–1)
DEPRECATED RDW RBC AUTO: 42.4 FL (ref 37–54)
ERYTHROCYTE [DISTWIDTH] IN BLOOD BY AUTOMATED COUNT: 12.1 % (ref 12.3–15.4)
HCT VFR BLD AUTO: 41.7 % (ref 34–46.6)
HGB BLD-MCNC: 14.2 G/DL (ref 12–15.9)
LDH SERPL-CCNC: 305 U/L (ref 135–214)
MCH RBC QN AUTO: 32.3 PG (ref 26.6–33)
MCHC RBC AUTO-ENTMCNC: 34.1 G/DL (ref 31.5–35.7)
MCV RBC AUTO: 94.8 FL (ref 79–97)
PLATELET # BLD AUTO: 376 10*3/MM3 (ref 140–450)
PMV BLD AUTO: 9.3 FL (ref 6–12)
RBC # BLD AUTO: 4.4 10*6/MM3 (ref 3.77–5.28)
RH BLD: POSITIVE
T&S EXPIRATION DATE: NORMAL
URATE SERPL-MCNC: 4.6 MG/DL (ref 2.4–5.7)
WBC NRBC COR # BLD AUTO: 7.64 10*3/MM3 (ref 3.4–10.8)

## 2024-09-26 PROCEDURE — 82247 BILIRUBIN TOTAL: CPT | Performed by: OBSTETRICS & GYNECOLOGY

## 2024-09-26 PROCEDURE — 99222 1ST HOSP IP/OBS MODERATE 55: CPT | Performed by: OBSTETRICS & GYNECOLOGY

## 2024-09-26 PROCEDURE — 36415 COLL VENOUS BLD VENIPUNCTURE: CPT | Performed by: OBSTETRICS & GYNECOLOGY

## 2024-09-26 PROCEDURE — 86901 BLOOD TYPING SEROLOGIC RH(D): CPT | Performed by: OBSTETRICS & GYNECOLOGY

## 2024-09-26 PROCEDURE — 25010000002 MAGNESIUM SULFATE 20 GM/500ML SOLUTION: Performed by: OBSTETRICS & GYNECOLOGY

## 2024-09-26 PROCEDURE — 84075 ASSAY ALKALINE PHOSPHATASE: CPT | Performed by: OBSTETRICS & GYNECOLOGY

## 2024-09-26 PROCEDURE — 25010000002 ENOXAPARIN PER 10 MG: Performed by: OBSTETRICS & GYNECOLOGY

## 2024-09-26 PROCEDURE — 84450 TRANSFERASE (AST) (SGOT): CPT | Performed by: OBSTETRICS & GYNECOLOGY

## 2024-09-26 PROCEDURE — 82565 ASSAY OF CREATININE: CPT | Performed by: OBSTETRICS & GYNECOLOGY

## 2024-09-26 PROCEDURE — 25810000003 LACTATED RINGERS PER 1000 ML: Performed by: OBSTETRICS & GYNECOLOGY

## 2024-09-26 PROCEDURE — 83615 LACTATE (LD) (LDH) ENZYME: CPT | Performed by: OBSTETRICS & GYNECOLOGY

## 2024-09-26 PROCEDURE — 25010000002 FUROSEMIDE PER 20 MG

## 2024-09-26 PROCEDURE — 86900 BLOOD TYPING SEROLOGIC ABO: CPT | Performed by: OBSTETRICS & GYNECOLOGY

## 2024-09-26 PROCEDURE — 84550 ASSAY OF BLOOD/URIC ACID: CPT | Performed by: OBSTETRICS & GYNECOLOGY

## 2024-09-26 PROCEDURE — 85027 COMPLETE CBC AUTOMATED: CPT | Performed by: OBSTETRICS & GYNECOLOGY

## 2024-09-26 PROCEDURE — 84460 ALANINE AMINO (ALT) (SGPT): CPT | Performed by: OBSTETRICS & GYNECOLOGY

## 2024-09-26 PROCEDURE — 86850 RBC ANTIBODY SCREEN: CPT | Performed by: OBSTETRICS & GYNECOLOGY

## 2024-09-26 RX ORDER — NIFEDIPINE 10 MG/1
10-20 CAPSULE ORAL
Status: DISCONTINUED | OUTPATIENT
Start: 2024-09-26 | End: 2024-09-27 | Stop reason: HOSPADM

## 2024-09-26 RX ORDER — SODIUM CHLORIDE 0.9 % (FLUSH) 0.9 %
10 SYRINGE (ML) INJECTION EVERY 12 HOURS SCHEDULED
Status: DISCONTINUED | OUTPATIENT
Start: 2024-09-26 | End: 2024-09-27 | Stop reason: HOSPADM

## 2024-09-26 RX ORDER — LABETALOL HYDROCHLORIDE 5 MG/ML
20-80 INJECTION, SOLUTION INTRAVENOUS
Status: DISCONTINUED | OUTPATIENT
Start: 2024-09-26 | End: 2024-09-27 | Stop reason: HOSPADM

## 2024-09-26 RX ORDER — SODIUM CHLORIDE 0.9 % (FLUSH) 0.9 %
10 SYRINGE (ML) INJECTION AS NEEDED
Status: DISCONTINUED | OUTPATIENT
Start: 2024-09-26 | End: 2024-09-27 | Stop reason: HOSPADM

## 2024-09-26 RX ORDER — MAGNESIUM SULFATE HEPTAHYDRATE 40 MG/ML
2 INJECTION, SOLUTION INTRAVENOUS CONTINUOUS
Status: DISPENSED | OUTPATIENT
Start: 2024-09-26 | End: 2024-09-27

## 2024-09-26 RX ORDER — SODIUM CHLORIDE, SODIUM LACTATE, POTASSIUM CHLORIDE, CALCIUM CHLORIDE 600; 310; 30; 20 MG/100ML; MG/100ML; MG/100ML; MG/100ML
50 INJECTION, SOLUTION INTRAVENOUS CONTINUOUS
Status: DISCONTINUED | OUTPATIENT
Start: 2024-09-26 | End: 2024-09-27 | Stop reason: HOSPADM

## 2024-09-26 RX ORDER — BISACODYL 10 MG
10 SUPPOSITORY, RECTAL RECTAL DAILY PRN
Status: DISCONTINUED | OUTPATIENT
Start: 2024-09-26 | End: 2024-09-27 | Stop reason: HOSPADM

## 2024-09-26 RX ORDER — HYDRALAZINE HYDROCHLORIDE 20 MG/ML
5-10 INJECTION INTRAMUSCULAR; INTRAVENOUS
Status: DISCONTINUED | OUTPATIENT
Start: 2024-09-26 | End: 2024-09-27 | Stop reason: HOSPADM

## 2024-09-26 RX ORDER — FUROSEMIDE 10 MG/ML
40 INJECTION INTRAMUSCULAR; INTRAVENOUS EVERY 6 HOURS
Status: DISCONTINUED | OUTPATIENT
Start: 2024-09-26 | End: 2024-09-26

## 2024-09-26 RX ORDER — IBUPROFEN 600 MG/1
600 TABLET, FILM COATED ORAL EVERY 6 HOURS PRN
Status: DISCONTINUED | OUTPATIENT
Start: 2024-09-26 | End: 2024-09-27 | Stop reason: HOSPADM

## 2024-09-26 RX ORDER — FUROSEMIDE 10 MG/ML
40 INJECTION INTRAMUSCULAR; INTRAVENOUS EVERY 6 HOURS
Status: COMPLETED | OUTPATIENT
Start: 2024-09-26 | End: 2024-09-26

## 2024-09-26 RX ORDER — SODIUM CHLORIDE 9 MG/ML
40 INJECTION, SOLUTION INTRAVENOUS AS NEEDED
Status: DISCONTINUED | OUTPATIENT
Start: 2024-09-26 | End: 2024-09-27 | Stop reason: HOSPADM

## 2024-09-26 RX ORDER — ENOXAPARIN SODIUM 100 MG/ML
40 INJECTION SUBCUTANEOUS NIGHTLY
Status: DISCONTINUED | OUTPATIENT
Start: 2024-09-26 | End: 2024-09-27 | Stop reason: HOSPADM

## 2024-09-26 RX ORDER — DOCUSATE SODIUM 100 MG/1
100 CAPSULE, LIQUID FILLED ORAL 2 TIMES DAILY PRN
Status: DISCONTINUED | OUTPATIENT
Start: 2024-09-26 | End: 2024-09-27 | Stop reason: HOSPADM

## 2024-09-26 RX ADMIN — IBUPROFEN 600 MG: 600 TABLET, FILM COATED ORAL at 22:45

## 2024-09-26 RX ADMIN — SODIUM CHLORIDE, POTASSIUM CHLORIDE, SODIUM LACTATE AND CALCIUM CHLORIDE 50 ML/HR: 600; 310; 30; 20 INJECTION, SOLUTION INTRAVENOUS at 22:45

## 2024-09-26 RX ADMIN — ENOXAPARIN SODIUM 40 MG: 100 INJECTION SUBCUTANEOUS at 21:40

## 2024-09-26 RX ADMIN — MAGNESIUM SULFATE HEPTAHYDRATE 2 G/HR: 40 INJECTION, SOLUTION INTRAVENOUS at 18:39

## 2024-09-26 RX ADMIN — SODIUM CHLORIDE, POTASSIUM CHLORIDE, SODIUM LACTATE AND CALCIUM CHLORIDE 50 ML/HR: 600; 310; 30; 20 INJECTION, SOLUTION INTRAVENOUS at 13:07

## 2024-09-26 RX ADMIN — FUROSEMIDE 40 MG: 10 INJECTION, SOLUTION INTRAMUSCULAR; INTRAVENOUS at 17:07

## 2024-09-26 RX ADMIN — FUROSEMIDE 40 MG: 10 INJECTION, SOLUTION INTRAMUSCULAR; INTRAVENOUS at 23:35

## 2024-09-26 RX ADMIN — Medication 10 ML: at 21:40

## 2024-09-26 NOTE — H&P
"[facility]  Obstetric History and Physical    CC:    Headache, RUQ pain and high blood pressures      HPI:      Patient is a 38 y.o. female  POD#10 S/P a RLTCS presents with headache, RUQ pain and elevated blood pressures at home.   She has had pre-eclampsia with her last 2 pregnancies.  She is currently chronic anemia and is on Lovenox due to superficial thrombophlebitis.  She denies chest pain and SOA.            The following portions of the patients history were reviewed and updated as appropriate: current medications, allergies, past medical history, past surgical history, past family history, past social history and problem list .       Prenatal Information:   Maternal Prenatal Labs  Blood Type No results found for: \"ABO\"   Rh Status No results found for: \"RH\"   Antibody Screen No results found for: \"ABSCRN\"   Gonnorhea No results found for: \"GCCX\"   Chlamydia No results found for: \"CLAMYDCU\"   RPR No results found for: \"RPR\"   Syphilis Antibody No results found for: \"SYPHILIS\"   Rubella No results found for: \"RUBELLAIGGIN\"   Hepatitis B Surface Antigen No results found for: \"HEPBSAG\"   HIV-1 Antibody No results found for: \"LABHIV1\"   Hepatitis C Antibody No results found for: \"HEPCAB\"   Rapid Urin Drug Screen No results found for: \"AMPMETHU\", \"BARBITSCNUR\", \"LABBENZSCN\", \"LABMETHSCN\", \"LABOPIASCN\", \"THCURSCR\", \"COCAINEUR\", \"AMPHETSCREEN\", \"PROPOXSCN\", \"BUPRENORSCNU\", \"METAMPSCNUR\", \"OXYCODONESCN\", \"TRICYCLICSCN\"   Group B Strep Culture No results found for: \"GBSANTIGEN\"           External Prenatal Results       Pregnancy Outside Results - Transcribed From Office Records - See Scanned Records For Details       Test Value Date Time    ABO  A  24 0920    Rh  Positive  24 0920    Antibody Screen  Negative  24 0920       Negative  24 1426    Varicella IgG       Rubella  24.20 index 24 1426    Hgb  14.2 g/dL 24 1125       10.7 g/dL 24 0646       12.4 g/dL 24 " 0920       12.0 g/dL 24 1426    Hct  41.7 % 24 1125       31.2 % 24 0646       36.6 % 24 0920       34.5 % 24 1426    HgB A1c        1h GTT  115 mg/dL 24 1226    3h GTT Fasting       3h GTT 1 hour       3h GTT 2 hour       3h GTT 3 hour        Gonorrhea (discrete)  Negative  24 1426    Chlamydia (discrete)  Negative  24 1426    RPR  Non-Reactive  24 1226       Non-Reactive  24 142    Syphils cascade: TP-Ab (FTA)  Non-Reactive  24 09    TP-Ab  Non-Reactive  24 09    TP-Ab (EIA)       TPPA       HBsAg  Non-Reactive  24 142    Herpes Simplex Virus PCR       Herpes Simplex VIrus Culture       HIV  Non-Reactive  24 142    Hep C RNA Quant PCR       Hep C Antibody  Non-Reactive  24 142    AFP       NIPT ^ declined  24     Cystic Fibroisis        Group B Strep       GBS Susceptibility to Clindamycin       GBS Susceptibility to Erythromycin       Fetal Fibronectin       Genetic Testing, Maternal Blood                 Drug Screening       Test Value Date Time    Urine Drug Screen       Amphetamine Screen  Negative  24 1426    Barbiturate Screen  Negative  24 1426    Benzodiazepine Screen  Negative  24 1426    Methadone Screen  Negative  24 1426    Phencyclidine Screen  Negative  24 1426    Opiates Screen  Positive  24 1426    THC Screen  Negative  24 142    Cocaine Screen       Propoxyphene Screen       Buprenorphine Screen  Negative  24 1426    Methamphetamine Screen       Oxycodone Screen  Negative  24 1426    Tricyclic Antidepressants Screen  Negative  24 142              Legend    ^: Historical                              Past OB History:     OB History    Para Term  AB Living   7 4 4 0 3 4   SAB IAB Ectopic Molar Multiple Live Births   3 0 0 0 0 4      # Outcome Date GA Lbr Pool/2nd Weight Sex Type Anes PTL Lv   7 Term 24 37w0d  3445 g (7  lb 9.5 oz) F CS-LTranv Spinal N NATALIYA      Name: Ortega Calloway      Apgar1: 8  Apgar5: 9   6 2022           5 SAB 2021           4 Term 10/07/20   3742 g (8 lb 4 oz) M CS-Unspec   NATALIYA      Complications: Delivery by elective  section      Name: Tu   3 SAB 10/2019              Complications: Hx of dilation and curettage   2 Term 19   3856 g (8 lb 8 oz) F CS-Unspec   NATALIYA      Complications: Delivery by elective  section      Name: Holley   1 Term 17 41w0d  3799 g (8 lb 6 oz) M CS-Unspec   NATALIYA      Complications: Failed induction of labor      Name: Padmaja      Obstetric Comments   2017 - Chao (FOB #1)    - Holley (FOB #1)    - Tu (FOB #1)    -  (FOB #1)       Past Medical History: Past Medical History:   Diagnosis Date    Hypothyroidism 2007    Been in the normal range for long time now    Preeclampsia in postpartum period     Preeclampsia in postpartum period       Past Surgical History Past Surgical History:   Procedure Laterality Date     SECTION  10/07/2020     SECTION  2019     SECTION Bilateral 2024    Procedure: Repeat ;  Surgeon: Janes Jacob MD;  Location: [facility] LABOR DELIVERY;  Service: Obstetrics/Gynecology;  Laterality: Bilateral;     SECTION PRIMARY  2017    DILATATION AND CURETTAGE  10/02/2019      Family History: Family History   Problem Relation Age of Onset    Hyperlipidemia Father     Diabetes Sister         Type I    Heart disease Maternal Grandmother     Stroke Maternal Grandmother             Cancer Maternal Aunt         Thyroid cancer    Cancer Maternal Aunt         Uterine cancer    COPD Maternal Grandfather     Breast cancer Neg Hx     Endometrial cancer Neg Hx     Ovarian cancer Neg Hx       Social History:  reports that she has never smoked. She has never used smokeless tobacco.   reports no history of alcohol use.   reports no history of drug  use.              Objective     Vital Signs Range for the last 24 hours  Temperature:     Temp Source:     BP: BP: (159-168)/(85-89) 159/86   Pulse:     Respirations:     SPO2:     O2 Amount (l/min):     O2 Devices     Weight:       Physical Examination: General appearance - alert, and in no distress and oriented to person, place, and time  Chest - Breathing is unlaboured   Heart - Slight tachycardia rate   Abdomen - soft, Mild tenderness in her RUQ with palpation   no rebound tenderness noted  No guarding,   Extremities - pedal edema +2 with varicosities         Laboratory Results:   Lab Results   Component Value Date     2024    HGB 14.2 2024    HCT 41.7 2024    WBC 7.64 2024      Lab Results   Component Value Date    HGB 14.2 2024     2024    AST 32 2024    ALT 52 (H) 2024     (H) 2024    URICACID 4.6 2024    BUN 11 2023    CREATININE 0.62 2024    GLUCOSE 79 2023              Assessment/Plan    39 yo  POD #10 S/P RLTCS with post partum pre-eclampsia     Post partum pre-eclampsia  -   1. Admit to APU   2. IV, CBC, PEP   3. Magnesium 4 gms loading followed by 2 grams maintenance    4. Repeat labs in the a.m.    5. Regular diet   6. Continue Lovenox 40 mg nightly       Mynor Celis MD  2024  12:41 EDT

## 2024-09-27 VITALS
HEART RATE: 72 BPM | RESPIRATION RATE: 16 BRPM | OXYGEN SATURATION: 97 % | TEMPERATURE: 98 F | SYSTOLIC BLOOD PRESSURE: 117 MMHG | DIASTOLIC BLOOD PRESSURE: 72 MMHG

## 2024-09-27 LAB
ALP SERPL-CCNC: 104 U/L (ref 39–117)
ALT SERPL W P-5'-P-CCNC: 43 U/L (ref 1–33)
AST SERPL-CCNC: 20 U/L (ref 1–32)
BILIRUB SERPL-MCNC: 0.5 MG/DL (ref 0–1.2)
CREAT SERPL-MCNC: 0.68 MG/DL (ref 0.57–1)
DEPRECATED RDW RBC AUTO: 41.1 FL (ref 37–54)
ERYTHROCYTE [DISTWIDTH] IN BLOOD BY AUTOMATED COUNT: 12.1 % (ref 12.3–15.4)
HCT VFR BLD AUTO: 43.1 % (ref 34–46.6)
HGB BLD-MCNC: 14.8 G/DL (ref 12–15.9)
LDH SERPL-CCNC: 300 U/L (ref 135–214)
MCH RBC QN AUTO: 31.9 PG (ref 26.6–33)
MCHC RBC AUTO-ENTMCNC: 34.3 G/DL (ref 31.5–35.7)
MCV RBC AUTO: 92.9 FL (ref 79–97)
PLATELET # BLD AUTO: 386 10*3/MM3 (ref 140–450)
PMV BLD AUTO: 9.3 FL (ref 6–12)
RBC # BLD AUTO: 4.64 10*6/MM3 (ref 3.77–5.28)
URATE SERPL-MCNC: 5.3 MG/DL (ref 2.4–5.7)
WBC NRBC COR # BLD AUTO: 7.35 10*3/MM3 (ref 3.4–10.8)

## 2024-09-27 PROCEDURE — 99238 HOSP IP/OBS DSCHRG MGMT 30/<: CPT | Performed by: STUDENT IN AN ORGANIZED HEALTH CARE EDUCATION/TRAINING PROGRAM

## 2024-09-27 PROCEDURE — 83615 LACTATE (LD) (LDH) ENZYME: CPT | Performed by: OBSTETRICS & GYNECOLOGY

## 2024-09-27 PROCEDURE — 25010000002 MAGNESIUM SULFATE 20 GM/500ML SOLUTION: Performed by: OBSTETRICS & GYNECOLOGY

## 2024-09-27 PROCEDURE — 84450 TRANSFERASE (AST) (SGOT): CPT | Performed by: OBSTETRICS & GYNECOLOGY

## 2024-09-27 PROCEDURE — 84550 ASSAY OF BLOOD/URIC ACID: CPT | Performed by: OBSTETRICS & GYNECOLOGY

## 2024-09-27 PROCEDURE — 82247 BILIRUBIN TOTAL: CPT | Performed by: OBSTETRICS & GYNECOLOGY

## 2024-09-27 PROCEDURE — 84460 ALANINE AMINO (ALT) (SGPT): CPT | Performed by: OBSTETRICS & GYNECOLOGY

## 2024-09-27 PROCEDURE — 85027 COMPLETE CBC AUTOMATED: CPT | Performed by: OBSTETRICS & GYNECOLOGY

## 2024-09-27 PROCEDURE — 84075 ASSAY ALKALINE PHOSPHATASE: CPT | Performed by: OBSTETRICS & GYNECOLOGY

## 2024-09-27 PROCEDURE — 82565 ASSAY OF CREATININE: CPT | Performed by: OBSTETRICS & GYNECOLOGY

## 2024-09-27 RX ADMIN — MAGNESIUM SULFATE HEPTAHYDRATE 2 G/HR: 40 INJECTION, SOLUTION INTRAVENOUS at 05:30

## 2024-09-27 NOTE — PROGRESS NOTES
[place]  Tanja Calloway  : 1985  MRN: [id number]  CSN: [id number]    Hospital Day: 2      Subjective     CC: Postpartum readmission - preeclampsia       Tanja Calloway is a 38 y.o.  who is now POD11 from [facility] who presented for headache, swelling, elevated BP and was found to have preeclampsia with severe features. She was readmitted yesterday and started on magnesium 4/2 x 24 hours. She has a history of preeclampsia with each pregnancy. Pregnancy also complicated by anemia and superficial thrombophlebitis, on Lovenox.     Today, patient reports feeling well. No headache, vision changes. No shortness of breath. Does have occasional palpitations, but this has been throughout pregnancy as well. She would like to go home as soon as she is able to today.   Objective     Patient Vitals for the past 24 hrs:   BP Temp Temp src Pulse Resp SpO2   24 0920 112/71 97.7 °F (36.5 °C) Oral 78 18 98 %   24 0815 -- -- -- 87 -- --   24 0812 115/72 97.9 °F (36.6 °C) Oral 75 18 98 %   24 0643 111/63 -- -- 63 16 98 %   24 0529 113/58 -- -- 67 16 96 %   24 0433 107/63 -- -- 74 16 97 %   24 0332 115/68 98 °F (36.7 °C) Oral 68 16 96 %   24 0238 114/57 -- -- 85 18 97 %   24 0139 118/72 -- -- 90 18 97 %   24 0036 122/73 -- -- 87 18 97 %   24 2335 128/74 99.1 °F (37.3 °C) Oral 86 16 95 %   24 2238 128/74 -- -- 78 16 100 %   24 2140 133/71 -- -- 73 16 98 %   24 2038 125/69 -- -- 72 18 97 %   24 1942 124/74 98.5 °F (36.9 °C) Oral 67 18 98 %   24 1832 130/71 -- -- 77 20 98 %   24 1730 130/70 -- -- 68 20 98 %   24 1629 119/72 -- -- 71 16 98 %   24 1530 128/66 99 °F (37.2 °C) Oral 72 16 99 %   24 1432 125/67 -- -- 86 18 99 %   24 1334 143/83 -- -- 60 20 99 %   24 1331 154/85 -- -- 68 -- --   24 1330 -- -- -- 70 -- 99 %   24 1326 147/75 -- -- 60 -- --   24 1325 -- -- --  63 -- 97 %   09/26/24 1322 160/77 -- -- 62 -- --   09/26/24 1320 -- -- -- 57 -- 97 %   09/26/24 1316 160/83 -- -- 51 -- --   09/26/24 1315 -- -- -- 55 -- 98 %   09/26/24 1313 -- -- -- -- 16 --   09/26/24 1310 (!) 182/88 -- -- 52 -- 98 %   09/26/24 1200 159/86 -- -- -- -- --   09/26/24 1150 163/89 -- -- -- -- --   09/26/24 1141 168/85 98 °F (36.7 °C) Oral 86 18 99 %          General: well developed; well nourished  no acute distress   Abdomen: soft, non-tender; no masses   Pelvic: Not performed   Ext: Calves NT     Lab Results   Component Value Date    WBC 7.35 09/27/2024    HGB 14.8 09/27/2024    HCT 43.1 09/27/2024    MCV 92.9 09/27/2024     09/27/2024    RH Positive 09/26/2024    HEPBSAG Non-Reactive 05/16/2024                Assessment      Plan     Postpartum preeclampsia   - Continue magnesium x 24 hours (will stop at 1300 today)   - Monitor BP after magnesium infusion.   - Labs stable   - Plan for DC home this evening or tomorrow AM depending on BP  2. Superficial thrombophlebitis   - Continue home Lovenox     Divina Pizano MD  9/27/2024  09:20 EDT

## 2024-09-27 NOTE — DISCHARGE SUMMARY
OMARI Hilda Calloway  : 1985  MRN: [id number]  CSN: [id number]    Discharge Summary      Date of Admission: 2024   Date of Discharge: 2024   Discharge Diagnoses: Postpartum preeclampsia with severe features    D/C Hospital Problem List:   Preeclampsia in postpartum period  2. Superficial thrombophlebitis   Procedures Performed:       Consults: None   Brief History: Patient is a 38 y.o.  who presented for headaches, swelling and elevated BP 10 days post-op.   Hospital Course: Patient was diagnosed with postpartum preeclampsia with severe features and received 24 hours of magnesium sulfate with improvement in blood pressure. She also received Lasix for swelling. Blood pressures were normal after magnesium was discontinued and patient was discharged home with plans for office follow up next week.    Pending Studies: Pending tests: none   Condition at discharge: stable   Discharge Medications:    Your medication list        CONTINUE taking these medications        Instructions Last Dose Given Next Dose Due   acetaminophen 325 MG tablet  Commonly known as: TYLENOL      Take 2 tablets by mouth Every 6 (Six) Hours As Needed for Mild Pain.       Enoxaparin Sodium 40 MG/0.4ML solution prefilled syringe syringe  Commonly known as: LOVENOX      Inject 0.4 mL under the skin into the appropriate area as directed Every Night for 30 days. Indications: Prevention of Unwanted Clot in Veins       ferrous sulfate 325 (65 FE) MG tablet      Take 1 tablet by mouth Daily With Breakfast.       ibuprofen 600 MG tablet  Commonly known as: ADVIL,MOTRIN      Take 1 tablet by mouth Every 6 (Six) Hours As Needed for Mild Pain or Moderate Pain.       prenatal vitamin 27-0.8 27-0.8 MG tablet tablet      Take  by mouth Daily.       Stool Softener 100 MG capsule  Generic drug: docusate sodium      Take 1 capsule by mouth 2 (Two) Times a Day As Needed for Constipation.               Discharge Disposition:  home   Follow-up: Future Appointments   Date Time Provider Department Center   10/1/2024  1:20 PM [facility]   10/29/2024  1:40 PM Janes Jacob MD [facility]          This note has been electronically signed.    Divina Pizano MD  September 27, 2024

## 2024-09-27 NOTE — PAYOR COMM NOTE
"Caesar Calloway \"Farrah\" (38 y.o. Female)     From:  Elyse Churchill LPN, Utilization Review  Phone #[phone]  Fax #[phone]      [employer] Medicaid Ref#[id number]    Medical Inpatient Admission.          Date of Birth   1985    Social Security Number       Address   [address] [place] [address]    Home Phone   [phone]    MRN   [id number]       Pentecostal   Yarsanism    Marital Status                               Admission Date   9/26/24    Admission Type   Elective    Admitting Provider   Janes Jacob MD    Attending Provider   Janes Jacob MD    Department, Room/Bed   [facility] ANTEPARTUM, N331/1       Discharge Date       Discharge Disposition       Discharge Destination                                 Attending Provider: Janes Jacob MD    Allergies: Haemophilus Influenzae Vaccines    Isolation: None   Infection: None   Code Status: CPR    Ht: 165.1 cm (65\")   Wt: 103 kg (226 lb)    Admission Cmt: None   Principal Problem: Preeclampsia in postpartum period [O14.95]                   Active Insurance as of 9/26/2024       Primary Coverage       Payor Plan Insurance Group Employer/Plan Group    [place] [id number]       Payor Plan Address Payor Plan Phone Number Payor Plan Fax Number Effective Dates    [employer] [address] [address] [phone]  5/1/2024 - None Entered    [facility] [id number]         Subscriber Name Subscriber Birth Date Member ID       CAESAR CALLOWAY 1985 [id number]                     Emergency Contacts        (Rel.) Home Phone Work Phone Mobile Phone    GLENDYSCARLETTKARL (Spouse) [phone] -- [phone]              Insurance Information                  [place] Phone: [phone]    Subscriber: Caesar Calloway Subscriber#: [id number]    Group#: [id number] Precert#: [id number]             History & Physical        O'Broin, " What Is The Reason For Today's Visit?: Skin Lesion "MD Mynor at 24 1241          [facility]  Obstetric History and Physical    CC:    Headache, RUQ pain and high blood pressures      HPI:      Patient is a 38 y.o. female  POD#10 S/P a RLTCS presents with headache, RUQ pain and elevated blood pressures at home.   She has had pre-eclampsia with her last 2 pregnancies.  She is currently chronic anemia and is on Lovenox due to superficial thrombophlebitis.  She denies chest pain and SOA.            The following portions of the patients history were reviewed and updated as appropriate: current medications, allergies, past medical history, past surgical history, past family history, past social history and problem list .       Prenatal Information:   Maternal Prenatal Labs  Blood Type No results found for: \"ABO\"   Rh Status No results found for: \"RH\"   Antibody Screen No results found for: \"ABSCRN\"   Gonnorhea No results found for: \"GCCX\"   Chlamydia No results found for: \"CLAMYDCU\"   RPR No results found for: \"RPR\"   Syphilis Antibody No results found for: \"SYPHILIS\"   Rubella No results found for: \"RUBELLAIGGIN\"   Hepatitis B Surface Antigen No results found for: \"HEPBSAG\"   HIV-1 Antibody No results found for: \"LABHIV1\"   Hepatitis C Antibody No results found for: \"HEPCAB\"   Rapid Urin Drug Screen No results found for: \"AMPMETHU\", \"BARBITSCNUR\", \"LABBENZSCN\", \"LABMETHSCN\", \"LABOPIASCN\", \"THCURSCR\", \"COCAINEUR\", \"AMPHETSCREEN\", \"PROPOXSCN\", \"BUPRENORSCNU\", \"METAMPSCNUR\", \"OXYCODONESCN\", \"TRICYCLICSCN\"   Group B Strep Culture No results found for: \"GBSANTIGEN\"           External Prenatal Results       Pregnancy Outside Results - Transcribed From Office Records - See Scanned Records For Details       Test Value Date Time    ABO  A  24 0920    Rh  Positive  24 0920    Antibody Screen  Negative  24 0920       Negative  24 1426    Varicella IgG       Rubella  24.20 index 24 1426    Hgb  14.2 g/dL 24 1125       10.7 g/dL " What Is The Reason For Today's Visit? (Being Monitored For X): concerning skin lesions on an annual basis 24 0646       12.4 g/dL 24 0920       12.0 g/dL 24 1426    Hct  41.7 % 24 1125       31.2 % 24 0646       36.6 % 24 0920       34.5 % 24 1426    HgB A1c        1h GTT  115 mg/dL 24 1226    3h GTT Fasting       3h GTT 1 hour       3h GTT 2 hour       3h GTT 3 hour        Gonorrhea (discrete)  Negative  24 1426    Chlamydia (discrete)  Negative  24 1426    RPR  Non-Reactive  24 1226       Non-Reactive  24 142    Syphils cascade: TP-Ab (FTA)  Non-Reactive  24 09    TP-Ab  Non-Reactive  24    TP-Ab (EIA)       TPPA       HBsAg  Non-Reactive  24 142    Herpes Simplex Virus PCR       Herpes Simplex VIrus Culture       HIV  Non-Reactive  24 142    Hep C RNA Quant PCR       Hep C Antibody  Non-Reactive  24 142    AFP       NIPT ^ declined  24     Cystic Fibroisis        Group B Strep       GBS Susceptibility to Clindamycin       GBS Susceptibility to Erythromycin       Fetal Fibronectin       Genetic Testing, Maternal Blood                 Drug Screening       Test Value Date Time    Urine Drug Screen       Amphetamine Screen  Negative  24 1426    Barbiturate Screen  Negative  24 1426    Benzodiazepine Screen  Negative  24 1426    Methadone Screen  Negative  24 142    Phencyclidine Screen  Negative  24 142    Opiates Screen  Positive  24 1426    THC Screen  Negative  24 1426    Cocaine Screen       Propoxyphene Screen       Buprenorphine Screen  Negative  24 1426    Methamphetamine Screen       Oxycodone Screen  Negative  24 1426    Tricyclic Antidepressants Screen  Negative  24 142              Legend    ^: Historical                              Past OB History:     OB History    Para Term  AB Living   7 4 4 0 3 4   SAB IAB Ectopic Molar Multiple Live Births   3 0 0 0 0 4      # Outcome Date GA Lbr Pool/2nd Weight Sex Type Anes  PTL Lv   7 Term 24 37w0d  3445 g (7 lb 9.5 oz) F CS-LTranv Spinal N NATALIYA      Name: Ortega Calloway      Apgar1: 8  Apgar5: 9   6 2022           5 SAB 2021           4 Term 10/07/20   3742 g (8 lb 4 oz) M CS-Unspec   NATALIYA      Complications: Delivery by elective  section      Name: Tu   3 SAB 10/2019              Complications: Hx of dilation and curettage   2 Term 19   3856 g (8 lb 8 oz) F CS-Unspec   NATALIYA      Complications: Delivery by elective  section      Name: Holley   1 Term 17 41w0d  3799 g (8 lb 6 oz) M CS-Unspec   NATALIYA      Complications: Failed induction of labor      Name: Padmaja      Obstetric Comments   2017 - Chao (FOB #1)    - Holley (FOB #1)    - Tu (FOB #1)    -  (FOB #1)       Past Medical History: Past Medical History:   Diagnosis Date    Hypothyroidism 2007    Been in the normal range for long time now    Preeclampsia in postpartum period 2019    Preeclampsia in postpartum period       Past Surgical History Past Surgical History:   Procedure Laterality Date     SECTION  10/07/2020     SECTION  2019     SECTION Bilateral 2024    Procedure: Repeat ;  Surgeon: Janes Jacob MD;  Location: [facility] LABOR DELIVERY;  Service: Obstetrics/Gynecology;  Laterality: Bilateral;     SECTION PRIMARY  2017    DILATATION AND CURETTAGE  10/02/2019      Family History: Family History   Problem Relation Age of Onset    Hyperlipidemia Father     Diabetes Sister         Type I    Heart disease Maternal Grandmother     Stroke Maternal Grandmother             Cancer Maternal Aunt         Thyroid cancer    Cancer Maternal Aunt         Uterine cancer    COPD Maternal Grandfather     Breast cancer Neg Hx     Endometrial cancer Neg Hx     Ovarian cancer Neg Hx       Social History:  reports that she has never smoked. She has never used smokeless tobacco.   reports no history of  alcohol use.   reports no history of drug use.              Objective    Vital Signs Range for the last 24 hours  Temperature:     Temp Source:     BP: BP: (159-168)/(85-89) 159/86   Pulse:     Respirations:     SPO2:     O2 Amount (l/min):     O2 Devices     Weight:       Physical Examination: General appearance - alert, and in no distress and oriented to person, place, and time  Chest - Breathing is unlaboured   Heart - Slight tachycardia rate   Abdomen - soft, Mild tenderness in her RUQ with palpation   no rebound tenderness noted  No guarding,   Extremities - pedal edema +2 with varicosities         Laboratory Results:   Lab Results   Component Value Date     2024    HGB 14.2 2024    HCT 41.7 2024    WBC 7.64 2024      Lab Results   Component Value Date    HGB 14.2 2024     2024    AST 32 2024    ALT 52 (H) 2024     (H) 2024    URICACID 4.6 2024    BUN 11 2023    CREATININE 0.62 2024    GLUCOSE 79 2023              Assessment/Plan    37 yo  POD #10 S/P RLTCS with post partum pre-eclampsia     Post partum pre-eclampsia  -   1. Admit to APU   2. IV, CBC, PEP   3. Magnesium 4 gms loading followed by 2 grams maintenance    4. Repeat labs in the a.m.    5. Regular diet   6. Continue Lovenox 40 mg nightly       Mynor Celis MD  2024  12:41 EDT    Electronically signed by Mynor Celis MD at 24 1249       Vital Signs (last 2 days)       Date/Time Temp Temp src Pulse Resp BP Patient Position SpO2    24 1357 -- -- 77 -- -- -- 94    24 1355 -- -- 79 -- -- -- 96    24 1354 98.4 (36.9) -- 77 18 113/63 Lying --    24 1256 98.2 (36.8) Oral 74 18 118/76 Sitting 97    24 1156 98 (36.7) Oral 79 18 -- -- 99    24 1155 -- -- 72 -- 117/73 Sitting --    24 1059 -- -- 65 -- -- -- 98    24 1057 97.9 (36.6) Oral 70 18 111/74 Sitting --    24 0959 -- -- 74 18 --  -- 98    09/27/24 0958 97.4 (36.3) Oral 73 -- 115/70 -- --    09/27/24 0920 97.7 (36.5) Oral 78 18 112/71 Sitting 98    09/27/24 0815 -- -- 87 -- -- -- --    09/27/24 0812 97.9 (36.6) Oral 75 18 115/72 Sitting 98    09/27/24 0643 -- -- 63 16 111/63 Lying 98    09/27/24 0529 -- -- 67 16 113/58 Lying 96    09/27/24 0433 -- -- 74 16 107/63 Lying 97    09/27/24 0332 98 (36.7) Oral 68 16 115/68 Lying 96    09/27/24 0238 -- -- 85 18 114/57 Lying 97    09/27/24 0139 -- -- 90 18 118/72 Lying 97    09/27/24 0036 -- -- 87 18 122/73 Lying 97    09/26/24 2335 99.1 (37.3) Oral 86 16 128/74 Lying 95    09/26/24 2238 -- -- 78 16 128/74 Lying 100    09/26/24 2140 -- -- 73 16 133/71 Lying 98    09/26/24 2038 -- -- 72 18 125/69 Lying 97    09/26/24 1942 98.5 (36.9) Oral 67 18 124/74 Lying 98    09/26/24 1832 -- -- 77 20 130/71 Sitting 98    09/26/24 1730 -- -- 68 20 130/70 Sitting 98    09/26/24 1629 -- -- 71 16 119/72 Lying 98    09/26/24 1530 99 (37.2) Oral 72 16 128/66 Lying 99    09/26/24 1432 -- -- 86 18 125/67 Sitting 99    09/26/24 1334 -- -- 60 20 143/83 Sitting 99    09/26/24 1331 -- -- 68 -- 154/85 -- --    09/26/24 1330 -- -- 70 -- -- -- 99    09/26/24 1326 -- -- 60 -- 147/75 -- --    09/26/24 1325 -- -- 63 -- -- -- 97    09/26/24 1322 -- -- 62 -- 160/77 -- --    09/26/24 1320 -- -- 57 -- -- -- 97    09/26/24 1316 -- -- 51 -- 160/83 -- --    09/26/24 1315 -- -- 55 -- -- -- 98    09/26/24 1313 -- -- -- 16 -- -- --    09/26/24 1310 -- -- 52 -- 182/88 -- 98    09/26/24 1200 -- -- -- -- 159/86 -- --    09/26/24 1150 -- -- -- -- 163/89 -- --    09/26/24 1141 98 (36.7) Oral 86 18 168/85 -- 99          Facility-Administered Medications as of 9/27/2024   Medication Dose Route Frequency Provider Last Rate Last Admin    bisacodyl (DULCOLAX) suppository 10 mg  10 mg Rectal Daily PRN Mynor Celis MD        docusate sodium (COLACE) capsule 100 mg  100 mg Oral BID PRN Mynor Celis MD        Enoxaparin Sodium (LOVENOX) syringe 40  mg  40 mg Subcutaneous Nightly Mynor Celis MD   40 mg at 24 2140    [COMPLETED] furosemide (LASIX) injection 40 mg  40 mg Intravenous Q6H Dennis Yañez, [facility]   40 mg at 24 2335    hydrALAZINE (APRESOLINE) injection 5-10 mg  5-10 mg Intravenous Q20 Min PRN Mynor Celis MD        Or    labetalol (NORMODYNE,TRANDATE) injection 20-80 mg  20-80 mg Intravenous Q10 Min PRN Mynor Celis MD        Or    NIFEdipine (PROCARDIA) capsule 10-20 mg  10-20 mg Oral Q20 Min PRN Mynor Celis MD        ibuprofen (ADVIL,MOTRIN) tablet 600 mg  600 mg Oral Q6H PRN Armando Marks MD   600 mg at 24 224    lactated ringers infusion  50 mL/hr Intravenous Continuous Mynor Celis MD 50 mL/hr at 24 50 mL/hr at 24 224    [] magnesium sulfate 20 GM/500ML infusion  2 g/hr Intravenous Continuous Divina Pizano MD   Stopped at 24 1357    [COMPLETED] magnesium sulfate bolus from bag 0.04 g/mL solution 4 g  4 g Intravenous Once Mynor Celis MD   4 g at 24 1313    sodium chloride 0.9 % flush 10 mL  10 mL Intravenous Q12H Mynor Celis MD   10 mL at 24 2140    sodium chloride 0.9 % flush 10 mL  10 mL Intravenous PRN Mynor Celis MD        sodium chloride 0.9 % infusion 40 mL  40 mL Intravenous PRN Mynor Celis MD         Lab Results (last 48 hours)       Procedure Component Value Units Date/Time    Preeclampsia Panel [[id number]]  (Abnormal) Collected: 24 06    Specimen: Blood Updated: 24     Alkaline Phosphatase 104 U/L      ALT (SGPT) 43 U/L      AST (SGOT) 20 U/L      Creatinine 0.68 mg/dL      Total Bilirubin 0.5 mg/dL       U/L      Uric Acid 5.3 mg/dL     CBC (No Diff) [[id number]]  (Abnormal) Collected: 24    Specimen: Blood Updated: 24     WBC 7.35 10*3/mm3      RBC 4.64 10*6/mm3      Hemoglobin 14.8 g/dL      Hematocrit 43.1 %      MCV 92.9 fL      MCH 31.9 pg      MCHC 34.3 g/dL      RDW 12.1 %       RDW-SD 41.1 fl      MPV 9.3 fL      Platelets 386 10*3/mm3     Preeclampsia Panel [[id number]]  (Abnormal) Collected: 09/26/24 1125    Specimen: Blood Updated: 09/26/24 1223     Alkaline Phosphatase 105 U/L      ALT (SGPT) 52 U/L      AST (SGOT) 32 U/L      Creatinine 0.62 mg/dL      Total Bilirubin 0.5 mg/dL       U/L      Comment: Specimen hemolyzed.  Results may be affected.        Uric Acid 4.6 mg/dL     CBC (No Diff) [[id number]]  (Abnormal) Collected: 09/26/24 1125    Specimen: Blood Updated: 09/26/24 1150     WBC 7.64 10*3/mm3      RBC 4.40 10*6/mm3      Hemoglobin 14.2 g/dL      Hematocrit 41.7 %      MCV 94.8 fL      MCH 32.3 pg      MCHC 34.1 g/dL      RDW 12.1 %      RDW-SD 42.4 fl      MPV 9.3 fL      Platelets 376 10*3/mm3           Imaging Results (Last 48 Hours)       ** No results found for the last 48 hours. **          Orders (last 48 hrs)        Start     Ordered    09/27/24 0600  CBC (No Diff)  Morning Draw         09/26/24 1252    09/27/24 0600  Preeclampsia Panel  Morning Draw         09/26/24 1252    09/26/24 2240  ibuprofen (ADVIL,MOTRIN) tablet 600 mg  Every 6 Hours PRN         09/26/24 2240    09/26/24 2100  Enoxaparin Sodium (LOVENOX) syringe 40 mg  Nightly         09/26/24 1252    09/26/24 1800  furosemide (LASIX) injection 40 mg  Every 6 Hours         09/26/24 1702    09/26/24 1600  Vital Signs q 4 while awake  Every 4 Hours      Comments: While the patient is awake.    09/26/24 1252    09/26/24 1330  sodium chloride 0.9 % flush 10 mL  Every 12 Hours Scheduled         09/26/24 1241    09/26/24 1330  lactated ringers infusion  Continuous         09/26/24 1241    09/26/24 1330  magnesium sulfate bolus from bag 0.04 g/mL solution 4 g  Once         09/26/24 1241    09/26/24 1330  magnesium sulfate 20 GM/500ML infusion  Continuous         09/26/24 1241    09/26/24 1330  furosemide (LASIX) injection 40 mg  Every 6 Hours,   Status:  Discontinued         09/26/24 1241    09/26/24 1317  Type  "& Screen  STAT         09/26/24 1316    09/26/24 1253  Weigh Patient Daily  Daily       09/26/24 1252    09/26/24 1252  Diet: Regular/House; Fluid Consistency: Thin (IDDSI 0)  Diet Effective Now         09/26/24 1252    09/26/24 1251  bisacodyl (DULCOLAX) suppository 10 mg  Daily PRN         09/26/24 1252    09/26/24 1251  docusate sodium (COLACE) capsule 100 mg  2 Times Daily PRN         09/26/24 1252    09/26/24 1251  Admit To Obstetrics Inpatient  Once         09/26/24 1252    09/26/24 1251  Code Status and Medical Interventions: CPR (Attempt to Resuscitate); Full Support  Continuous         09/26/24 1252    09/26/24 1241  Insert Peripheral IV  Once         09/26/24 1241    09/26/24 1241  Saline Lock & Maintain IV Access  Continuous         09/26/24 1241    09/26/24 1240  hydrALAZINE (APRESOLINE) injection 5-10 mg  Every 20 Minutes PRN        Placed in \"Or\" Linked Group    09/26/24 1241    09/26/24 1240  labetalol (NORMODYNE,TRANDATE) injection 20-80 mg  Every 10 Minutes PRN        Placed in \"Or\" Linked Group    09/26/24 1241    09/26/24 1240  NIFEdipine (PROCARDIA) capsule 10-20 mg  Every 20 Minutes PRN        Placed in \"Or\" Linked Group    09/26/24 1241    09/26/24 1240  sodium chloride 0.9 % flush 10 mL  As Needed         09/26/24 1241    09/26/24 1240  sodium chloride 0.9 % infusion 40 mL  As Needed         09/26/24 1241    09/26/24 1240  Vital Signs  Every 15 Minutes       09/26/24 1241    09/26/24 1240  Continuous Pulse Oximetry  Continuous         09/26/24 1241    09/26/24 1240  Check Clonus (DTRs) With Vitals  Per Hospital Policy         09/26/24 1241    09/26/24 1240  Assess Lung Sounds  Per Hospital Policy         09/26/24 1241    09/26/24 1240  Assess LOC With Vitals  Per Hospital Policy         09/26/24 1241    09/26/24 1240  Strict I and O  Every Shift       09/26/24 1241    09/26/24 1240  Total IV Fluids Should Equal 125 mL/hour  Continuous         09/26/24 1241    09/26/24 1240  Notify Provider of " Suspected Magnesium Toxicity  Continuous        Comments: Open Order Report to View Parameters Requiring Provider Notification    24 1241    24 1126  Preeclampsia Panel  STAT         24 1125    24 1125  CBC (No Diff)  STAT         24 1125                     Physician Progress Notes (last 48 hours)        Divina Pizano MD at 24 0920           Hilda Calloway  : 1985  MRN: [id number]  CSN: [id number]    Hospital Day: 2      Subjective     CC: Postpartum readmission - preeclampsia       Tanja Calloway is a 38 y.o.  who is now POD11 from [facility] who presented for headache, swelling, elevated BP and was found to have preeclampsia with severe features. She was readmitted yesterday and started on magnesium 4/2 x 24 hours. She has a history of preeclampsia with each pregnancy. Pregnancy also complicated by anemia and superficial thrombophlebitis, on Lovenox.     Today, patient reports feeling well. No headache, vision changes. No shortness of breath. Does have occasional palpitations, but this has been throughout pregnancy as well. She would like to go home as soon as she is able to today.   Objective     Patient Vitals for the past 24 hrs:   BP Temp Temp src Pulse Resp SpO2   24 0920 112/71 97.7 °F (36.5 °C) Oral 78 18 98 %   24 0815 -- -- -- 87 -- --   24 0812 115/72 97.9 °F (36.6 °C) Oral 75 18 98 %   24 0643 111/63 -- -- 63 16 98 %   24 0529 113/58 -- -- 67 16 96 %   24 0433 107/63 -- -- 74 16 97 %   24 0332 115/68 98 °F (36.7 °C) Oral 68 16 96 %   24 0238 114/57 -- -- 85 18 97 %   24 0139 118/72 -- -- 90 18 97 %   24 0036 122/73 -- -- 87 18 97 %   24 2335 128/74 99.1 °F (37.3 °C) Oral 86 16 95 %   248 128/74 -- -- 78 16 100 %   24 2140 133/71 -- -- 73 16 98 %   248 125/69 -- -- 72 18 97 %   24 124/74 98.5 °F (36.9 °C) Oral 67 18 98 %   24  1832 130/71 -- -- 77 20 98 %   09/26/24 1730 130/70 -- -- 68 20 98 %   09/26/24 1629 119/72 -- -- 71 16 98 %   09/26/24 1530 128/66 99 °F (37.2 °C) Oral 72 16 99 %   09/26/24 1432 125/67 -- -- 86 18 99 %   09/26/24 1334 143/83 -- -- 60 20 99 %   09/26/24 1331 154/85 -- -- 68 -- --   09/26/24 1330 -- -- -- 70 -- 99 %   09/26/24 1326 147/75 -- -- 60 -- --   09/26/24 1325 -- -- -- 63 -- 97 %   09/26/24 1322 160/77 -- -- 62 -- --   09/26/24 1320 -- -- -- 57 -- 97 %   09/26/24 1316 160/83 -- -- 51 -- --   09/26/24 1315 -- -- -- 55 -- 98 %   09/26/24 1313 -- -- -- -- 16 --   09/26/24 1310 (!) 182/88 -- -- 52 -- 98 %   09/26/24 1200 159/86 -- -- -- -- --   09/26/24 1150 163/89 -- -- -- -- --   09/26/24 1141 168/85 98 °F (36.7 °C) Oral 86 18 99 %          General: well developed; well nourished  no acute distress   Abdomen: soft, non-tender; no masses   Pelvic: Not performed   Ext: Calves NT     Lab Results   Component Value Date    WBC 7.35 09/27/2024    HGB 14.8 09/27/2024    HCT 43.1 09/27/2024    MCV 92.9 09/27/2024     09/27/2024    RH Positive 09/26/2024    HEPBSAG Non-Reactive 05/16/2024               Assessment     Plan     Postpartum preeclampsia   - Continue magnesium x 24 hours (will stop at 1300 today)   - Monitor BP after magnesium infusion.   - Labs stable   - Plan for DC home this evening or tomorrow AM depending on BP  2. Superficial thrombophlebitis   - Continue home Lovenox     Divina Pizano MD  9/27/2024  09:20 EDT          Electronically signed by Divina Pizano MD at 09/27/24 0926       Consult Notes (last 48 hours)  Notes from 09/25/24 1426 through 09/27/24 1426   No notes of this type exist for this encounter.

## 2024-09-28 NOTE — PROGRESS NOTES
Subjective   No chief complaint on file.    Tanja Calloway is a 38 y.o. year old  presenting to be seen for her post-operative visit.  Currently she reports no problems with eating, bowel movements, voiding, or wound drainage and pain is well controlled. Feeling well overall. Sometimes is tired, but is doing well. Baby is doing well. She is breastfeeding and formula feeding due to not enough breast milk. BP at home has been normal. She is taking Lovenox for superficial thrombophlebitis without problems. Area of swelling/cord is improving.          Objective   LMP 2024     /72   Resp 14   Wt 91.2 kg (201 lb)   LMP 2024   Breastfeeding Yes   BMI 33.45 kg/m²    General:  well developed; well nourished  no acute distress  mentation appropriate   Abdomen: soft, non-tender; no masses  no umbilical or inguinal hernias are present  no hepato-splenomegaly  incision is clean, dry, intact, and without drainage   Pelvis: Not performed.          Assessment   S/P   Prior postpartum preeclampsia - normal BP today      Plan   Avoid tampons, douching and intercourse  Nothing per vagina still until 6 weeks after her procedure  The importance of keeping all planned follow-up and taking all medications as prescribed was emphasized.  Follow up  for postpartum visit       No orders of the defined types were placed in this encounter.         Divina Pizano MD  Obstetrics and Gynecology  [facility]

## 2024-10-01 ENCOUNTER — MATERNAL SCREENING (OUTPATIENT)
Dept: CALL CENTER | Facility: HOSPITAL | Age: 39
End: 2024-10-01
Payer: MEDICAID

## 2024-10-01 ENCOUNTER — POSTPARTUM VISIT (OUTPATIENT)
Dept: OBSTETRICS AND GYNECOLOGY | Facility: CLINIC | Age: 39
End: 2024-10-01
Payer: MEDICAID

## 2024-10-01 VITALS
DIASTOLIC BLOOD PRESSURE: 72 MMHG | SYSTOLIC BLOOD PRESSURE: 126 MMHG | BODY MASS INDEX: 33.45 KG/M2 | RESPIRATION RATE: 14 BRPM | WEIGHT: 201 LBS

## 2024-10-01 NOTE — OUTREACH NOTE
Maternal Screening Survey      Flowsheet Row Responses   Facility patient discharged from? [facility]   Attempt successful? Yes   Call start time    Call end time    EPD Scale: Able to Laugh 0-->as much as she always could   EPD Scale: Looked Forward 1-->rather less than she used to   EPD Scale: Blamed Self 1-->not very often   EPD Scale: Been Anxious 0-->no, not at all   EPD Scale: Felt Panicky 0-->no, not at all   EPD Scale: Things Getting on Top 0-->no, has been coping as well as ever   EPD Scale: Difficulty Sleeping 0-->no, not at all   EPD Scale: Sad or Miserable 0-->no, not at all   EPD Scale: Crying 0-->no, never   EPD Scale: Thought of Harming Self 0-->never   [place]  Depression Score 2   Did any of your parents have problems with alcohol or drug use? No   Do any of your peers have problems with alcohol or drug use? No   Does your partner have problems with alcohol or drug use? No   Before you were pregnant did you have problems with alcohol or drug use? (past) No   In the past month, did you drink beer, wine, liquor or use any other drugs? (pregnancy) No   Maternal Screening call completed Yes   Call end time 145              Shanae SANTIZO - Registered Nurse

## 2024-10-28 NOTE — PROGRESS NOTES
Subjective   Chief Complaint   Patient presents with    Postpartum Care     Tanja Calloway is a 38 y.o. year old  presenting to be seen for her postpartum visit.  She had a Repeat  (LTCS).  Her daughter is doing well.    She does not have concerns about post-partum blues/depression.   Deshler Score = 3  She is bottle feeding.  Since delivery she has not been sexually active.  For ongoing contraception, her plans are condoms.    The following portions of the patient's history were reviewed and updated as appropriate:current medications and allergies    Social History    Tobacco Use      Smoking status: Never      Smokeless tobacco: Never      Tobacco comments: None      Review of Systems  Constitutional POS: nothing reported    NEG: anorexia or night sweats   Genitourinary POS: nothing reported    NEG: dysuria or hematuria      Gastointestinal POS: nothing reported    NEG: bloating, change in bowel habits, melena, or reflux symptoms   Breast POS: nothing reported    NEG: persistent breast lump, skin dimpling, or nipple discharge        Objective   /80   Resp 14   Wt 93.9 kg (207 lb)   LMP 2024   Breastfeeding No   BMI 34.45 kg/m²     General:  well developed; well nourished  no acute distress  mentation appropriate   Abdomen: soft, non-tender; no masses  no umbilical or inguinal hernias are present  no hepato-splenomegaly  incision is healed, clean, dry, intact, and without drainage   Pelvis: Clinical staff was present for exam  External genitalia:  normal appearance of the external genitalia including Bartholin's and Kindred's glands.  :  urethral meatus normal;  Vaginal:  normal pink mucosa without prolapse or lesions.  Cervix:  normal appearance.  Uterus:  normal size, shape and consistency.  Adnexa:  normal bimanual exam of the adnexa.  Rectal:  digital rectal exam not performed; anus visually normal appearing.          Assessment   Normal 6 week postpartum exam S/P Repeat   (LTCS)  Palpitations - affecting her activities of daily living.  Extensive superficial varicosities with phlebitis, resolved.  Would benefit from long-term baby aspirin suppression     Plan   Pap was done today.  If she does not receive the results of the Pap within 2 weeks  time, she was instructed to call to find out the results.  I explained to Tanja that the recommendations for Pap smear interval in a low risk patient's has lengthened to 3 years time.  I encouraged her to be seen yearly for a full physical exam including breast and pelvic exam even during the off years when PAP's will not be performed.  The following tests were ordered today: TSH and T4 - free.  It was explained to Tanja that all lab test should be back within the one week after they are performed. She will be notified about the results, regardless of the findings. If she has not been contacted by the office within 2 weeks after the test has been performed, it is her responsibility to contact us to learn about her results.  Folic acid for the prevention of neural tube defects was discussed.  At least 0.4 mg should be taken preconceptionally to reduce the risk.  It was explained that this may reduce the baseline incidence of neural tube defect by as much as 65%.  Folic acid can be found in OTC multivitamins, OTC prenatal vitamins or breakfast cereal that that contains 100% of the RDA of folate.  Would encourage her to speak to her primary care about workup for the palpitations  Her vaccine record was reviewed and updated.  I discussed with Tanja that she may be behind on needed vaccinations for Influenza.  She may be able to obtain these vaccinations at her local pharmacy OR speak about obtaining them with her primary care.  If she does obtain her vaccines, I have asked Tanja to let us know the date each vaccine was obtained so that her medical record could be updated in our system.  The importance of keeping all planned  follow-up and taking all medications as prescribed was emphasized.  Follow up for annual exam 1 year           This note was electronically signed.    Janes Jacob M.D.  October 29, 2024    Part of this note may be an electronic transcription/translation of spoken language to printed text using the Dragon Dictation System.

## 2024-10-29 ENCOUNTER — POSTPARTUM VISIT (OUTPATIENT)
Dept: OBSTETRICS AND GYNECOLOGY | Facility: CLINIC | Age: 39
End: 2024-10-29
Payer: MEDICAID

## 2024-10-29 VITALS
BODY MASS INDEX: 34.45 KG/M2 | DIASTOLIC BLOOD PRESSURE: 80 MMHG | SYSTOLIC BLOOD PRESSURE: 122 MMHG | RESPIRATION RATE: 14 BRPM | WEIGHT: 207 LBS

## 2024-10-29 DIAGNOSIS — R00.2 PALPITATIONS: ICD-10-CM

## 2024-10-29 DIAGNOSIS — Z12.4 SCREENING FOR CERVICAL CANCER: ICD-10-CM

## 2024-10-29 PROCEDURE — 0503F POSTPARTUM CARE VISIT: CPT | Performed by: OBSTETRICS & GYNECOLOGY

## 2024-10-29 RX ORDER — MULTIVIT WITH MINERALS/LUTEIN
250 TABLET ORAL DAILY
COMMUNITY

## 2024-10-30 ENCOUNTER — LAB (OUTPATIENT)
Dept: LAB | Facility: HOSPITAL | Age: 39
End: 2024-10-30
Payer: MEDICAID

## 2024-10-30 DIAGNOSIS — R00.2 PALPITATIONS: ICD-10-CM

## 2024-10-30 LAB
REF LAB TEST METHOD: NORMAL
T4 FREE SERPL-MCNC: 0.75 NG/DL (ref 0.92–1.68)
TSH SERPL DL<=0.05 MIU/L-ACNC: 2.93 UIU/ML (ref 0.27–4.2)

## 2024-10-30 PROCEDURE — 36415 COLL VENOUS BLD VENIPUNCTURE: CPT

## 2024-10-30 PROCEDURE — 84443 ASSAY THYROID STIM HORMONE: CPT

## 2024-10-30 PROCEDURE — 84439 ASSAY OF FREE THYROXINE: CPT

## 2024-10-31 DIAGNOSIS — R00.2 PALPITATIONS: Primary | ICD-10-CM

## 2024-10-31 NOTE — PROGRESS NOTES
Thyroid test showed a very slightly low thyroid hormone level. I typically think of palpitations being associated with an over active thyroid. I would suggest repeating the test in 3 months. But if symptoms persist we can refer her to endocrinology for evaluation sooner. Order for lab work has been placed

## 2024-11-01 DIAGNOSIS — R00.2 PALPITATIONS: Primary | ICD-10-CM

## 2024-12-13 ENCOUNTER — OFFICE VISIT (OUTPATIENT)
Dept: CARDIOLOGY | Facility: CLINIC | Age: 39
End: 2024-12-13
Payer: MEDICAID

## 2024-12-13 VITALS
SYSTOLIC BLOOD PRESSURE: 118 MMHG | HEIGHT: 65 IN | HEART RATE: 83 BPM | DIASTOLIC BLOOD PRESSURE: 72 MMHG | WEIGHT: 210.4 LBS | OXYGEN SATURATION: 97 % | BODY MASS INDEX: 35.06 KG/M2

## 2024-12-13 DIAGNOSIS — R00.2 PALPITATIONS: Primary | ICD-10-CM

## 2024-12-13 PROCEDURE — 99243 OFF/OP CNSLTJ NEW/EST LOW 30: CPT | Performed by: PHYSICIAN ASSISTANT

## 2024-12-13 PROCEDURE — 93000 ELECTROCARDIOGRAM COMPLETE: CPT | Performed by: PHYSICIAN ASSISTANT

## 2024-12-13 RX ORDER — ASPIRIN 81 MG/1
TABLET ORAL
COMMUNITY

## 2024-12-13 NOTE — PROGRESS NOTES
[facility] at [facility]  INITIAL OFFICE CONSULT      Tanja Calloway  1985  PCP: Jennifer El MD    SUBJECTIVE:   Tanja Calloway is a 39 y.o. female seen for a consultation visit regarding the following:     Chief Complaint:   Chief Complaint   Patient presents with    Palpitations          Consultation is requested by Arturo Marrero MD for evaluation of Palpitations        History:  Pleasant 39 year old  female presents for consultations for Palpitations. 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 She states she has had Preeclampsia with her pregnancies. However she never had palpitations like what she is having now.  She denies any chest pain. She denies any dizziness, near syncope or syncope. She states recently these have decreased lately. She recently had a TSH panel that was abnormal and she is awaiting follow up Labs in few months.         Cardiac PMH: (Old records have been reviewed and summarized below)  Palpitations  Recent Pregnancy-fourth child, Pre ecampsia  Mild Obesity  Remote D and C  Superficial thrombosis, remote Lovenox use.     Past Medical History, Past Surgical History, Family history, Social History, and Medications were all reviewed with the patient today and updated as necessary.     Current Outpatient Medications   Medication Sig Dispense Refill    aspirin (ASPIR) 81 MG EC tablet       Prenatal Vit-Fe Fumarate-FA (prenatal vitamin 27-0.8) 27-0.8 MG tablet tablet Take  by mouth Daily.      vitamin C (ASCORBIC ACID) 250 MG tablet Take 1 tablet by mouth Daily.       No current facility-administered medications for this visit.     Allergies   Allergen Reactions    Haemophilus Influenzae Vaccines Rash         Past Medical History:   Diagnosis Date    Deep vein thrombosis 10/10/2020    Postpartum SVT    Hypothyroidism 2007    Been in the normal range for long time now    Preeclampsia in postpartum period 2019    Preeclampsia in postpartum  "period      Past Surgical History:   Procedure Laterality Date     SECTION  10/07/2020     SECTION  2019     SECTION Bilateral 2024    Procedure: Repeat ;  Surgeon: Janes Jacob MD;  Location: [facility] LABOR DELIVERY;  Service: Obstetrics/Gynecology;  Laterality: Bilateral;     SECTION PRIMARY  2017    DILATATION AND CURETTAGE  10/02/2019     Family History   Problem Relation Age of Onset    Hyperlipidemia Father     Hypertension Father     Diabetes Sister         Type I    Heart disease Maternal Grandmother     Stroke Maternal Grandmother             Cancer Maternal Aunt         Thyroid cancer    Cancer Maternal Aunt         Uterine cancer    COPD Maternal Grandfather     Arrhythmia Mother     Breast cancer Neg Hx     Endometrial cancer Neg Hx     Ovarian cancer Neg Hx      Social History     Tobacco Use    Smoking status: Never    Smokeless tobacco: Never    Tobacco comments:     None   Substance Use Topics    Alcohol use: Never       ROS:  Review of Symptoms:  General: no recent weight loss/gain, weakness or fatigue  Skin: no rashes, lumps, or other skin changes  HEENT: no dizziness, lightheadedness, or vision changes  Respiratory: no cough or hemoptysis  Cardiovascular: + palpitations, and tachycardia  Gastrointestinal: no black/tarry stools or diarrhea  Urinary: no change in frequency or urgency  Peripheral Vascular: no claudication or leg cramps  Musculoskeletal: no muscle or joint pain/stiffness  Psychiatric: no depression or excessive stress  Neurological: no sensory or motor loss, no syncope  Hematologic: no anemia, easy bruising or bleeding  Endocrine: no thyroid problems, nor heat or cold intolerance         PHYSICAL EXAM:   /72   Pulse 83   Ht 165.1 cm (65\")   Wt 95.4 kg (210 lb 6.4 oz)   SpO2 97%   BMI 35.01 kg/m²      Wt Readings from Last 5 Encounters:   24 95.4 kg (210 lb 6.4 oz)   10/29/24 93.9 kg (207 lb) "   10/01/24 91.2 kg (201 lb)   09/18/24 103 kg (226 lb)   09/13/24 103 kg (226 lb 13.7 oz)     BP Readings from Last 5 Encounters:   12/13/24 118/72   10/29/24 122/80   10/01/24 126/72   09/27/24 117/72   09/19/24 120/71       General-Well Nourished, Well developed  Eyes - PERRLA  Neck- supple, No mass  CV- regular rate and rhythm, no MRG  Lung- clear bilaterally  Abd- soft, +BS  Musc/skel - Norm strength and range of motion  Skin- warm and dry  Neuro - Alert & Oriented x 3, appropriate mood.    Patient's external notes were reviewed.  Independent interpretation of test performed by another physician in facility were reviewed.  Outside laboratory data was also reviewed.    Medical problems and test results were reviewed with the patient today.     Results for orders placed or performed in visit on 10/30/24   TSH    Collection Time: 10/30/24  8:47 AM    Specimen: Blood   Result Value Ref Range    TSH 2.930 0.270 - 4.200 uIU/mL   T4, Free    Collection Time: 10/30/24  8:47 AM    Specimen: Blood   Result Value Ref Range    Free T4 0.75 (L) 0.92 - 1.68 ng/dL         Lab Results   Component Value Date    CHOLESTEROL 196 06/26/2023    HDL CHOL 44 06/26/2023    LDL CHOL 110 (H) 06/26/2023    LDL/HDL RATIO 2.35 06/26/2023    VLDL CHOL 42 (H) 06/26/2023    TRIGLYCERIDES 242 (H) 06/26/2023       EKG:  (EKG/Tracing has been independently visualized by me and summarized below)      ECG 12 Lead    Date/Time: 12/13/2024 3:04 PM  Performed by: Pablito Salgado PA    Authorized by: Pablito Salgado PA  Comparison: not compared with previous ECG   Rhythm: sinus rhythm  Rate: normal  Conduction: conduction normal  ST Segments: ST segments normal  QRS axis: normal    Clinical impression: normal ECG          ASSESSMENT   1. Palpitations -Follow up TSH level. Holter monitor and Echocardiogram.     2. Recent pregnancy with preeclampsia     4. Mild Obesity.         PLAN  Holter  Echo.   Check TSH in a few months.   Follow up 3  months.           Cardiology/Electrophysiology  12/13/24  15:03 EST  Electronically signed by ÁNGEL Loaiza, 12/13/24, 1:26 PM EST.

## 2024-12-23 ENCOUNTER — TELEPHONE (OUTPATIENT)
Dept: CARDIOLOGY | Facility: CLINIC | Age: 39
End: 2024-12-23

## 2025-01-15 ENCOUNTER — TELEPHONE (OUTPATIENT)
Dept: CARDIOLOGY | Facility: CLINIC | Age: 40
End: 2025-01-15
Payer: MEDICAID

## 2025-01-15 NOTE — TELEPHONE ENCOUNTER
"    Caller: Tanja Calloway \"Farrah\"     Relationship: SELF    Best call back number: [phone]    What is your medical concern? PT RECENTLY WORE A HOLTER MONITOR. SHE RECEIVED A LETTER FROM INSURANCE DENYING THE CLAIM. IT STATED THAT THEY DID NOT RECEIVE THE NECESSARY DOCUMENTATION FROM THE DR TO BE ABLE TO PROCESS THIS CLAIM. PLEASE REACH OUT TO THE PT TO ADDRESS THIS.           "

## 2025-01-20 NOTE — TELEPHONE ENCOUNTER
I have submitted a PA for the services dates she wore her monitor through [employer]. PA  ID:[id number]

## 2025-03-06 ENCOUNTER — HOSPITAL ENCOUNTER (OUTPATIENT)
Dept: CARDIOLOGY | Facility: HOSPITAL | Age: 40
Discharge: HOME | End: 2025-03-06
Admitting: PHYSICIAN ASSISTANT
Payer: MEDICAID

## 2025-03-06 VITALS
DIASTOLIC BLOOD PRESSURE: 89 MMHG | HEIGHT: 65 IN | BODY MASS INDEX: 34.99 KG/M2 | WEIGHT: 210 LBS | SYSTOLIC BLOOD PRESSURE: 126 MMHG

## 2025-03-06 DIAGNOSIS — R00.2 PALPITATIONS: ICD-10-CM

## 2025-03-06 LAB
ASCENDING AORTA: 2.7 CM
AV MEAN PRESS GRAD SYS DOP V1V2: 4.2 MMHG
AV VMAX SYS DOP: 136 CM/SEC
BH CV ECHO MEAS - AO MAX PG: 7.4 MMHG
BH CV ECHO MEAS - AO ROOT DIAM: 3 CM
BH CV ECHO MEAS - AO V2 VTI: 28.9 CM
BH CV ECHO MEAS - AVA(I,D): 2.8 CM2
BH CV ECHO MEAS - IVS/LVPW: 1 CM
BH CV ECHO MEAS - IVSD: 1 CM
BH CV ECHO MEAS - LA DIMENSION: 3.4 CM
BH CV ECHO MEAS - LAT PEAK E' VEL: 16.5 CM/SEC
BH CV ECHO MEAS - LV MAX PG: 5.5 MMHG
BH CV ECHO MEAS - LV MEAN PG: 2.6 MMHG
BH CV ECHO MEAS - LV V1 MAX: 117 CM/SEC
BH CV ECHO MEAS - LV V1 VTI: 25.5 CM
BH CV ECHO MEAS - LVIDD: 4.6 CM
BH CV ECHO MEAS - LVIDS: 3.4 CM
BH CV ECHO MEAS - LVOT AREA: 3.1 CM2
BH CV ECHO MEAS - LVOT DIAM: 2 CM
BH CV ECHO MEAS - LVPWD: 1 CM
BH CV ECHO MEAS - MED PEAK E' VEL: 13.3 CM/SEC
BH CV ECHO MEAS - MV A MAX VEL: 59.8 CM/SEC
BH CV ECHO MEAS - MV DEC SLOPE: 432.4 CM/SEC2
BH CV ECHO MEAS - MV E MAX VEL: 87.9 CM/SEC
BH CV ECHO MEAS - MV E/A: 1.47
BH CV ECHO MEAS - MV MAX PG: 3.5 MMHG
BH CV ECHO MEAS - MV MEAN PG: 1.7 MMHG
BH CV ECHO MEAS - MV V2 VTI: 24.5 CM
BH CV ECHO MEAS - MVA(VTI): 3.3 CM2
BH CV ECHO MEAS - PA ACC TIME: 0.12 SEC
BH CV ECHO MEAS - PA V2 MAX: 87.6 CM/SEC
BH CV ECHO MEAS - RAP SYSTOLE: 8 MMHG
BH CV ECHO MEAS - RVSP: 27 MMHG
BH CV ECHO MEAS - SV(LVOT): 80.1 ML
BH CV ECHO MEAS - SVI(LVOT): 39.7 ML/M2
BH CV ECHO MEAS - TAPSE (>1.6): 2.37 CM
BH CV ECHO MEAS - TR MAX PG: 19 MMHG
BH CV ECHO MEAS - TR MAX VEL: 217.9 CM/SEC
BH CV ECHO MEASUREMENTS AVERAGE E/E' RATIO: 5.9
BH CV VAS BP LEFT ARM: NORMAL MMHG
BH CV XLRA - RV BASE: 3 CM
BH CV XLRA - RV MID: 2.05 CM
BH CV XLRA - TDI S': 13.4 CM/SEC
LEFT ATRIUM VOLUME INDEX: 25.1 ML/M2
LV EF BIPLANE MOD: 57.7 %

## 2025-03-06 PROCEDURE — 93306 TTE W/DOPPLER COMPLETE: CPT

## 2025-03-13 ENCOUNTER — OFFICE VISIT (OUTPATIENT)
Dept: CARDIOLOGY | Facility: CLINIC | Age: 40
End: 2025-03-13
Payer: MEDICAID

## 2025-03-13 ENCOUNTER — LAB (OUTPATIENT)
Dept: LAB | Facility: HOSPITAL | Age: 40
End: 2025-03-13
Payer: MEDICAID

## 2025-03-13 VITALS
WEIGHT: 214 LBS | SYSTOLIC BLOOD PRESSURE: 126 MMHG | DIASTOLIC BLOOD PRESSURE: 80 MMHG | OXYGEN SATURATION: 98 % | HEART RATE: 75 BPM | HEIGHT: 65 IN | BODY MASS INDEX: 35.65 KG/M2

## 2025-03-13 DIAGNOSIS — R00.2 PALPITATIONS: Primary | ICD-10-CM

## 2025-03-13 DIAGNOSIS — R00.2 PALPITATIONS: ICD-10-CM

## 2025-03-13 DIAGNOSIS — I49.3 PVC (PREMATURE VENTRICULAR CONTRACTION): ICD-10-CM

## 2025-03-13 LAB
T-UPTAKE NFR SERPL: 1.03 TBI (ref 0.8–1.3)
T4 SERPL-MCNC: 4.52 MCG/DL (ref 4.5–11.7)
TSH SERPL DL<=0.05 MIU/L-ACNC: 1.65 UIU/ML (ref 0.27–4.2)

## 2025-03-13 PROCEDURE — 36415 COLL VENOUS BLD VENIPUNCTURE: CPT

## 2025-03-13 PROCEDURE — 84436 ASSAY OF TOTAL THYROXINE: CPT

## 2025-03-13 PROCEDURE — 99214 OFFICE O/P EST MOD 30 MIN: CPT | Performed by: PHYSICIAN ASSISTANT

## 2025-03-13 PROCEDURE — 84479 ASSAY OF THYROID (T3 OR T4): CPT

## 2025-03-13 PROCEDURE — 84443 ASSAY THYROID STIM HORMONE: CPT

## 2025-03-13 NOTE — PROGRESS NOTES
Cardiac Electrophysiology Outpatient Follow Up Note            [facility] at [facility]    Follow Up Office Visit      Tanja Calloway  [id number]  2025    Primary Care Physician: Jennifer El MD        Subjective     Chief Complaint:   Diagnoses and all orders for this visit:    1. Palpitations (Primary)    2. PVC (premature ventricular contraction)      Chief Complaint   Patient presents with    Palpitations     Less frequent, but still present    Chest Pain     pressure       History of Present Illness:   Tanja Calloway is a 39 y.o. female who presents to  electrophysiology clinic for follow up of palpitations.  She wore Holter monitor revealing rare PVCs less than 1%.  She had echocardiogram revealing normal function.  She reports her palpitations are somewhat diminished since her pregnancy .  She rarely has these at this time.  She does have some mild sleep apnea symptoms snoring but she does not wish to pursue sleep study at this time.  She is not having chest pain dizziness near syncope or syncope.  She is working full-time for [employer] she also takes care of 4 children at home.  Overall she feels she is doing well.    Past Medical History:   Past Medical History:   Diagnosis Date    Deep vein thrombosis 10/10/2020    Postpartum SVT    Hypothyroidism 2007    Been in the normal range for long time now    PONV (postoperative nausea and vomiting) 2024    Post spinal for     Preeclampsia in postpartum period     Preeclampsia in postpartum period     Recurrent pregnancy loss, antepartum condition or complication 10/2019, 2022, 2023    All together 3 miscarriages    Urinary tract infection 2024    Resolved       Past Surgical History:   Past Surgical History:   Procedure Laterality Date     SECTION  10/07/2020     SECTION  2019     SECTION Bilateral 2024     "Procedure: Repeat ;  Surgeon: Janes Jacob MD;  Location: [facility] LABOR DELIVERY;  Service: Obstetrics/Gynecology;  Laterality: Bilateral;     SECTION PRIMARY  2017    D & C WITH SUCTION  10/2019    DILATATION AND CURETTAGE  10/02/2019       Family History:   Family History   Problem Relation Age of Onset    Hyperlipidemia Father     Hypertension Father     Diabetes Sister         Type I    Heart disease Maternal Grandmother     Stroke Maternal Grandmother             Cancer Maternal Aunt         Thyroid cancer    Cancer Maternal Aunt         Uterine cancer    Uterine cancer Maternal Aunt     COPD Maternal Grandfather     Arrhythmia Mother     Breast cancer Neg Hx     Endometrial cancer Neg Hx     Ovarian cancer Neg Hx        Social History:   Social History     Socioeconomic History    Marital status:    Tobacco Use    Smoking status: Never     Passive exposure: Never    Smokeless tobacco: Never    Tobacco comments:     None   Vaping Use    Vaping status: Never Used   Substance and Sexual Activity    Alcohol use: Never    Drug use: Never    Sexual activity: Yes     Partners: Male     Birth control/protection: Condom       Medications:     Current Outpatient Medications:     aspirin (ASPIR) 81 MG EC tablet, Take 1 tablet by mouth Daily., Disp: , Rfl:     Prenatal Vit-Fe Fumarate-FA (prenatal vitamin 27-0.8) 27-0.8 MG tablet tablet, Take  by mouth Daily., Disp: , Rfl:     vitamin C (ASCORBIC ACID) 250 MG tablet, Take 1 tablet by mouth Daily., Disp: , Rfl:     Allergies:   Allergies   Allergen Reactions    Haemophilus Influenzae Vaccines Rash       Objective   Vital Signs:   Vitals:    25 1251   BP: 126/80   BP Location: Right arm   Patient Position: Sitting   Pulse: 75   SpO2: 98%   Weight: 97.1 kg (214 lb)   Height: 165.1 cm (65\")       PHYSICAL EXAM  General appearance: Awake, alert, cooperative  Head: Normocephalic, without obvious abnormality, atraumatic  Eyes: " "Conjunctivae/corneas clear, EOMs intact  Neck: no adenopathy, no carotid bruit, no JVD, and thyroid: not enlarged  Lungs: clear to auscultation bilaterally and no rhonchi or crackles\", ' symmetric  Heart: regular rate and rhythm, S1, S2 normal, no murmur, click, rub or gallop  Abdomen: Soft, non-tender, bowel sounds normal,  no organomegaly  Extremities: extremities normal, atraumatic, no cyanosis or edema  Skin: Skin color, turgor normal, no rashes or lesions  Neurologic: Grossly normal     Lab Results   Component Value Date    GLUCOSE 79 06/26/2023    CALCIUM 9.6 06/26/2023     06/26/2023    K 4.0 06/26/2023    CO2 23.3 06/26/2023     06/26/2023    BUN 11 06/26/2023    CREATININE 0.68 09/27/2024    BCR 15.7 06/26/2023    ANIONGAP 10.7 06/26/2023     Lab Results   Component Value Date    WBC 7.35 09/27/2024    HGB 14.8 09/27/2024    HCT 43.1 09/27/2024    MCV 92.9 09/27/2024     09/27/2024     No results found for: \"INR\", \"PROTIME\"  Lab Results   Component Value Date    TSH 2.930 10/30/2024       Cardiac Testing:     I personally viewed and interpreted the patient's EKG/Telemetry/lab data  Normal sinus rhythm  Procedures       Assessment & Plan    Diagnoses and all orders for this visit:    1. Palpitations (Primary)    2. PVC (premature ventricular contraction)         Diagnosis Plan   1. Palpitations  Minimally symptomatic.  Holter monitor less than 1% burden.  Normal echocardiogram.  These are low risk in nature.  She has no symptoms currently.  Will continue to monitor for any change in symptoms otherwise return for office as needed basis.      2. PVC (premature ventricular contraction)  Less than 1% burden.  Minimally symptomatic.  Normal structural heart.      If change in symptoms we will pursue further treatment options otherwise she is Cora symptomatic these are low risk for her.  Will continue to monitor for any change in symptoms she return for problems as needed basis.  She is going " to follow-up on thyroid studies.  Electronically signed by ÁNGEL Loaiza, 03/13/25, 1:42 PM EDT.

## (undated) DEVICE — SUT GUT CHRM 1 CTX 36IN 905H

## (undated) DEVICE — GLV SURG BIOGEL LTX PF 7 1/2

## (undated) DEVICE — ENSEAL 20 CM SHAFT, LARGE JAW: Brand: ENSEAL X1

## (undated) DEVICE — STPLR SKIN SUBCUTICULAR INSORB 2030

## (undated) DEVICE — SOL IRR NACL 0.9PCT BT 1000ML

## (undated) DEVICE — SUT VIC 2/0 CT1 27IN J339H BX/36

## (undated) DEVICE — MAT PREVALON MOBL TRANSFR AIR WO/PAD 39X80IN

## (undated) DEVICE — PK C/SECT 10

## (undated) DEVICE — SUT PDS 0 CTX 36IN DYED Z370T

## (undated) DEVICE — SOL IRR H2O BTL 1000ML STRL

## (undated) DEVICE — SUT VIC 3/0 CT1 27IN DYED J338H

## (undated) DEVICE — TRY SPINE BLCK WHITACRE 25G 3X5IN

## (undated) DEVICE — PROXIMATE RH ROTATING HEAD SKIN STAPLERS (35 WIDE) CONTAINS 35 STAINLESS STEEL STAPLES: Brand: PROXIMATE